# Patient Record
Sex: FEMALE | Race: WHITE | NOT HISPANIC OR LATINO | Employment: FULL TIME | URBAN - METROPOLITAN AREA
[De-identification: names, ages, dates, MRNs, and addresses within clinical notes are randomized per-mention and may not be internally consistent; named-entity substitution may affect disease eponyms.]

---

## 2024-08-06 ENCOUNTER — ROUTINE PRENATAL (OUTPATIENT)
Dept: OBGYN CLINIC | Facility: CLINIC | Age: 39
End: 2024-08-06
Payer: COMMERCIAL

## 2024-08-06 ENCOUNTER — TELEPHONE (OUTPATIENT)
Dept: OBGYN CLINIC | Facility: MEDICAL CENTER | Age: 39
End: 2024-08-06

## 2024-08-06 VITALS — DIASTOLIC BLOOD PRESSURE: 74 MMHG | BODY MASS INDEX: 35.22 KG/M2 | SYSTOLIC BLOOD PRESSURE: 122 MMHG | WEIGHT: 186.4 LBS

## 2024-08-06 DIAGNOSIS — Z98.891 HISTORY OF PRIMARY CESAREAN SECTION: ICD-10-CM

## 2024-08-06 DIAGNOSIS — Z86.73 HISTORY OF STROKE: ICD-10-CM

## 2024-08-06 DIAGNOSIS — Z3A.36 36 WEEKS GESTATION OF PREGNANCY: ICD-10-CM

## 2024-08-06 DIAGNOSIS — Z34.93 THIRD TRIMESTER PREGNANCY: ICD-10-CM

## 2024-08-06 DIAGNOSIS — O10.919 CHRONIC HYPERTENSION AFFECTING PREGNANCY: ICD-10-CM

## 2024-08-06 DIAGNOSIS — O09.899 SHORT INTERVAL BETWEEN PREGNANCIES AFFECTING PREGNANCY, ANTEPARTUM: ICD-10-CM

## 2024-08-06 DIAGNOSIS — Z30.09 STERILIZATION EDUCATION: Primary | ICD-10-CM

## 2024-08-06 DIAGNOSIS — O09.523 MULTIGRAVIDA OF ADVANCED MATERNAL AGE IN THIRD TRIMESTER: ICD-10-CM

## 2024-08-06 PROCEDURE — 87150 DNA/RNA AMPLIFIED PROBE: CPT | Performed by: ADVANCED PRACTICE MIDWIFE

## 2024-08-06 PROCEDURE — PNV: Performed by: ADVANCED PRACTICE MIDWIFE

## 2024-08-06 PROCEDURE — 59025 FETAL NON-STRESS TEST: CPT | Performed by: ADVANCED PRACTICE MIDWIFE

## 2024-08-06 PROCEDURE — 76815 OB US LIMITED FETUS(S): CPT | Performed by: ADVANCED PRACTICE MIDWIFE

## 2024-08-06 NOTE — ASSESSMENT & PLAN NOTE
Desires tubal sterilization  - Patient desires tubal ligation and MA-31 form was signed today  - Today we had a detailed discussion regarding alternatives to permanent contraception. The patient would like to proceed with laparoscopic salpingectomy.  - We discussed the risks and benefits of the procedure and established expectations. We discussed surgical risks including bleeding, injury to nearby structures.   - We discussed that tubal sterilization is considered a permanent procedure. Future pregnancy would only be possible through in vitro fertilization.  We discussed that most women are satisfied with their decision to undergo permanent sterilization, but some women experience regret ranging widely from 2 to 26 percent.   - Sommer is aware that this will only happen if she is having a  and that in cases of emergency , procedure may not be completed.

## 2024-08-06 NOTE — PATIENT INSTRUCTIONS
PT AWARE ,WILL CONTINUE MED AND WILL SCHEDULE APPOINT IN 3 MONTHS Thank you for choosing us for your  care today.  If you have any questions about your ultrasound or care, please do not hesitate to contact us or your primary obstetrician.        Some general instructions for your pregnancy are:    Exercise: Aim for 150 minutes per week of regular exercise.  Walking is great!  Nutrition: Choose healthy sources of calcium, iron, and protein.  Avoid ultraprocessed foods and added sugar.  Learn about Preeclampsia: preeclampsia is a common, potentially serious high blood pressure complication in pregnancy.  A blood pressure of 140mmHg (systolic or top number) or 90mmHg (diastolic or bottom number) should be evaluated by your doctor.  Aspirin is sometimes prescribed in early pregnancy to prevent preeclampsia in women with risk factors - ask your obstetrician if you should be on this medication.  For more resources, visit:  https://www.highriskpregnancyinfo.org/preeclampsia  If you smoke, please try to quit completely but also try to reduce your smoking by as much as possible (as soon as possible).  Do not vape.  Please also avoid cannabis products.  Other warning signs to watch out for in pregnancy or postpartum: chest pain, obstructed breathing or shortness of breath, seizures, thoughts of hurting yourself or your baby, bleeding, a painful or swollen leg, fever, or headache (see AWH[facility] POST-BIRTH Warning Signs campaign).  If these happen call 911.  Itching is also not normal in pregnancy and if you experience this, especially over your hands and feet, potentially worse at night, notify your doctors.

## 2024-08-06 NOTE — TELEPHONE ENCOUNTER
Call made to hospital. Tubal added.     ----- Message from Leslee Caruso CNM sent at 2024  9:23 AM EDT -----  Regarding: joleen Novoa is scheduled for a  on the . She would like to have tubal sterilization at that time. Counseling reviewed at visit today.

## 2024-08-06 NOTE — ASSESSMENT & PLAN NOTE
- repeat  24- scrub given with instructions. Desres tubal sterilization  -OB nurse navigator notified of desire to have tubal with   - Labetalol 200 mg BID  - NST/SUSAN weekly-  reactive/13.9  - reviewed s/s PTL, FKC  - NIPS- low risk  - to decrease ASA to 1 daily  - next visit 1 week

## 2024-08-06 NOTE — PROGRESS NOTES
Routine Prenatal Visit  [facility] [address] [place]    Assessment/Plan:  Sommer is a 38 y.o. year old  at 36w5d who presents for routine prenatal visit.     1. Sterilization education  Assessment & Plan:  Desires tubal sterilization  - Patient desires tubal ligation and MA-31 form was signed today  - Today we had a detailed discussion regarding alternatives to permanent contraception. The patient would like to proceed with laparoscopic salpingectomy.  - We discussed the risks and benefits of the procedure and established expectations. We discussed surgical risks including bleeding, injury to nearby structures.   - We discussed that tubal sterilization is considered a permanent procedure. Future pregnancy would only be possible through in vitro fertilization.  We discussed that most women are satisfied with their decision to undergo permanent sterilization, but some women experience regret ranging widely from 2 to 26 percent.   - Sommer is aware that this will only happen if she is having a  and that in cases of emergency , procedure may not be completed.    2. History of primary  section  3. Multigravida of advanced maternal age in third trimester  4. History of stroke  5. Short interval between pregnancies affecting pregnancy, antepartum  6. Chronic hypertension affecting pregnancy  7. Third trimester pregnancy  -     Strep B DNA probe, amplification  8. 36 weeks gestation of pregnancy  Assessment & Plan:  - repeat  24- scrub given with instructions. Desres tubal sterilization  -OB nurse navigator notified of desire to have tubal with   - Labetalol 200 mg BID  - NST/SUSAN weekly-  reactive/13.9  - reviewed s/s PTL, FKC  - NIPS- low risk  - to decrease ASA to 1 daily  - next visit 1 week    Orders:  -     Strep B DNA probe, amplification        Subjective:     CC: Prenatal care    Sommer Hernandez is a 38 y.o.   female who presents for routine prenatal care at 36w5d.  Pregnancy ROS: no leakage of fluid, pelvic pain, or vaginal bleeding.  Good fetal movement. To decrease ASA to 1 daily. Was nauseated and felt sick at onset of visit. B/P 122/74. Had crackers and banana this morning. Taking B/P daily denies pressures over 140/90.  Negative s/s preeclampsia.     The following portions of the patient's history were reviewed and updated as appropriate: allergies, current medications, past family history, past medical history, obstetric history, gynecologic history, past social history, past surgical history and problem list.      Objective:  /74   Wt 84.6 kg (186 lb 6.4 oz)   LMP 2023 (Exact Date)   BMI 35.22 kg/m²   Pregravid Weight/BMI: Pregravid weight not on file (BMI Could not be calculated)  Current Weight: 84.6 kg (186 lb 6.4 oz)   Total Weight Gain: Not found.   Pre-Rosalia Vitals    Flowsheet Row Most Recent Value   Prenatal Assessment    Fetal Heart Rate 140   Movement Present   Prenatal Vitals    Blood Pressure 122/74   Weight - Scale 84.6 kg (186 lb 6.4 oz)   Urine Albumin/Glucose    Dilation/Effacement/Station    Vaginal Drainage    Edema    LLE Edema Trace   RLE Edema Trace           General: Well appearing, no distress  Respiratory: Unlabored breathing  Cardiovascular: Regular rate.  Abdomen: Soft, gravid, nontender  Fundal Height: Appropriate for gestational age.  Extremities: Warm and well perfused.  Non tender.    no abrasions, no jaundice, no lesions, no pruritis, and no rashes.

## 2024-08-08 LAB — GP B STREP DNA SPEC QL NAA+PROBE: NEGATIVE

## 2024-08-09 ENCOUNTER — ULTRASOUND (OUTPATIENT)
Dept: PERINATAL CARE | Facility: OTHER | Age: 39
End: 2024-08-09
Payer: COMMERCIAL

## 2024-08-09 VITALS
BODY MASS INDEX: 35.34 KG/M2 | SYSTOLIC BLOOD PRESSURE: 114 MMHG | DIASTOLIC BLOOD PRESSURE: 70 MMHG | HEART RATE: 92 BPM | WEIGHT: 187.2 LBS | HEIGHT: 61 IN

## 2024-08-09 DIAGNOSIS — O09.899 SHORT INTERVAL BETWEEN PREGNANCIES AFFECTING PREGNANCY, ANTEPARTUM: ICD-10-CM

## 2024-08-09 DIAGNOSIS — O10.919 CHRONIC HYPERTENSION AFFECTING PREGNANCY: Primary | ICD-10-CM

## 2024-08-09 DIAGNOSIS — Z3A.37 37 WEEKS GESTATION OF PREGNANCY: ICD-10-CM

## 2024-08-09 DIAGNOSIS — O09.523 MULTIGRAVIDA OF ADVANCED MATERNAL AGE IN THIRD TRIMESTER: ICD-10-CM

## 2024-08-09 PROCEDURE — 76816 OB US FOLLOW-UP PER FETUS: CPT | Performed by: OBSTETRICS & GYNECOLOGY

## 2024-08-09 PROCEDURE — 99213 OFFICE O/P EST LOW 20 MIN: CPT | Performed by: OBSTETRICS & GYNECOLOGY

## 2024-08-09 NOTE — PROGRESS NOTES
"[facility]: Sommer Hernandez was seen today for fetal growth assessment ultrasound.  See ultrasound report under \"OB Procedures\" tab.   The time spent on this established patient on the encounter date included 5 minutes previsit service time reviewing records and precharting, 5 minutes face-to-face service time counseling regarding results and coordinating care, and  5 minutes charting, totalling 15 minutes.  Please don't hesitate to contact our office with any concerns or questions.  -Dottie Mathews MD    "

## 2024-08-12 ENCOUNTER — ROUTINE PRENATAL (OUTPATIENT)
Dept: OBGYN CLINIC | Facility: CLINIC | Age: 39
End: 2024-08-12

## 2024-08-12 VITALS — SYSTOLIC BLOOD PRESSURE: 102 MMHG | WEIGHT: 187 LBS | DIASTOLIC BLOOD PRESSURE: 72 MMHG | BODY MASS INDEX: 35.33 KG/M2

## 2024-08-12 DIAGNOSIS — O09.523 MULTIGRAVIDA OF ADVANCED MATERNAL AGE IN THIRD TRIMESTER: ICD-10-CM

## 2024-08-12 DIAGNOSIS — O10.919 CHRONIC HYPERTENSION AFFECTING PREGNANCY: Primary | ICD-10-CM

## 2024-08-12 DIAGNOSIS — O09.899 SHORT INTERVAL BETWEEN PREGNANCIES AFFECTING PREGNANCY, ANTEPARTUM: ICD-10-CM

## 2024-08-12 DIAGNOSIS — Z3A.37 37 WEEKS GESTATION OF PREGNANCY: ICD-10-CM

## 2024-08-12 DIAGNOSIS — I65.22 STENOSIS OF LEFT CAROTID ARTERY: ICD-10-CM

## 2024-08-12 PROCEDURE — PNV: Performed by: STUDENT IN AN ORGANIZED HEALTH CARE EDUCATION/TRAINING PROGRAM

## 2024-08-12 NOTE — PROGRESS NOTES
Routine Prenatal Visit  [facility] [address] [place]    Assessment/Plan:  Sommer is a 38 y.o. year old  at 37w4d who presents for routine prenatal visit.     1. Chronic hypertension affecting pregnancy  2. Short interval between pregnancies affecting pregnancy, antepartum  3. Multigravida of advanced maternal age in third trimester  4. Stenosis of left carotid artery  5. 37 weeks gestation of pregnancy        Subjective:     CC: Prenatal care    Sommer Hernandez is a 38 y.o.  female who presents for routine prenatal care at 37w4d.  Pregnancy ROS: Denies leakage of fluid, pelvic pain, or vaginal bleeding.  Reports fetal movement.    The following portions of the patient's history were reviewed and updated as appropriate: allergies, current medications, past family history, past medical history, obstetric history, gynecologic history, past social history, past surgical history and problem list.      Objective:  /72   Wt 84.8 kg (187 lb)   LMP 2023 (Exact Date)   BMI 35.33 kg/m²   Pregravid Weight/BMI: Pregravid weight not on file (BMI Could not be calculated)  Current Weight: 84.8 kg (187 lb)   Total Weight Gain: Not found.   Pre-Rosalia Vitals      Flowsheet Row Most Recent Value   Prenatal Assessment    Fetal Heart Rate 132   Movement Present   Prenatal Vitals    Blood Pressure 102/72   Weight - Scale 84.8 kg (187 lb)   Urine Albumin/Glucose    Dilation/Effacement/Station    Vaginal Drainage    Edema    LLE Edema Trace   RLE Edema Trace             General: Well appearing, no distress  Respiratory: Unlabored breathing  Cardiovascular: Regular rate.  Abdomen: Soft, gravid, nontender  Fundal Height: Appropriate for gestational age.  Extremities: Warm and well perfused.  Non tender.    Fetal NST  Baseline: 130 BPM  Accelerations: Yes  Decelerations: None  Uterine Irritability: No  Contractions: Not present     Impression: Reactive    SUSAN 10  dorian Flores MD  [facility]  [facility]  8/12/2024 4:06 PM

## 2024-08-14 ENCOUNTER — ANESTHESIA EVENT (INPATIENT)
Dept: LABOR AND DELIVERY | Facility: HOSPITAL | Age: 39
End: 2024-08-14
Payer: COMMERCIAL

## 2024-08-16 ENCOUNTER — ANESTHESIA (INPATIENT)
Dept: LABOR AND DELIVERY | Facility: HOSPITAL | Age: 39
End: 2024-08-16
Payer: COMMERCIAL

## 2024-08-16 ENCOUNTER — HOSPITAL ENCOUNTER (INPATIENT)
Facility: HOSPITAL | Age: 39
LOS: 5 days | Discharge: HOME | End: 2024-08-21
Attending: STUDENT IN AN ORGANIZED HEALTH CARE EDUCATION/TRAINING PROGRAM | Admitting: STUDENT IN AN ORGANIZED HEALTH CARE EDUCATION/TRAINING PROGRAM
Payer: COMMERCIAL

## 2024-08-16 DIAGNOSIS — Z98.891 S/P REPEAT LOW TRANSVERSE C-SECTION: ICD-10-CM

## 2024-08-16 DIAGNOSIS — O10.919 CHRONIC HYPERTENSION AFFECTING PREGNANCY: ICD-10-CM

## 2024-08-16 DIAGNOSIS — Z3A.37 37 WEEKS GESTATION OF PREGNANCY: Primary | ICD-10-CM

## 2024-08-16 PROBLEM — Z3A.38 38 WEEKS GESTATION OF PREGNANCY: Status: ACTIVE | Noted: 2024-02-28

## 2024-08-16 LAB
ABO GROUP BLD: NORMAL
BASE EXCESS BLDCOA CALC-SCNC: -4.4 MMOL/L (ref 3–11)
BASE EXCESS BLDCOV CALC-SCNC: -2.6 MMOL/L (ref 1–9)
BLD GP AB SCN SERPL QL: NEGATIVE
ERYTHROCYTE [DISTWIDTH] IN BLOOD BY AUTOMATED COUNT: 14 % (ref 11.6–15.1)
HCO3 BLDCOA-SCNC: 22.3 MMOL/L (ref 17.3–27.3)
HCO3 BLDCOV-SCNC: 22.8 MMOL/L (ref 12.2–28.6)
HCT VFR BLD AUTO: 32.9 % (ref 34.8–46.1)
HGB BLD-MCNC: 10.6 G/DL (ref 11.5–15.4)
HOLD SPECIMEN: NORMAL
MCH RBC QN AUTO: 28.1 PG (ref 26.8–34.3)
MCHC RBC AUTO-ENTMCNC: 32.2 G/DL (ref 31.4–37.4)
MCV RBC AUTO: 87 FL (ref 82–98)
O2 CT VFR BLDCOA CALC: 4.6 ML/DL
OXYHGB MFR BLDCOA: 23.2 %
OXYHGB MFR BLDCOV: 57.3 %
PCO2 BLDCOA: 46.7 MM[HG] (ref 30–60)
PCO2 BLDCOV: 41.8 MM HG (ref 27–43)
PH BLDCOA: 7.3 [PH] (ref 7.23–7.43)
PH BLDCOV: 7.36 [PH] (ref 7.19–7.49)
PLATELET # BLD AUTO: 241 THOUSANDS/UL (ref 149–390)
PMV BLD AUTO: 11.1 FL (ref 8.9–12.7)
PO2 BLDCOA: 12.3 MM HG (ref 5–25)
PO2 BLDCOV: 23.3 MM HG (ref 15–45)
RBC # BLD AUTO: 3.77 MILLION/UL (ref 3.81–5.12)
RH BLD: POSITIVE
SAO2 % BLDCOV: 12 ML/DL
SPECIMEN EXPIRATION DATE: NORMAL
TREPONEMA PALLIDUM IGG+IGM AB [PRESENCE] IN SERUM OR PLASMA BY IMMUNOASSAY: NORMAL
WBC # BLD AUTO: 9.72 THOUSAND/UL (ref 4.31–10.16)

## 2024-08-16 PROCEDURE — 94762 N-INVAS EAR/PLS OXIMTRY CONT: CPT

## 2024-08-16 PROCEDURE — 86900 BLOOD TYPING SEROLOGIC ABO: CPT

## 2024-08-16 PROCEDURE — NC001 PR NO CHARGE

## 2024-08-16 PROCEDURE — 59510 CESAREAN DELIVERY: CPT | Performed by: STUDENT IN AN ORGANIZED HEALTH CARE EDUCATION/TRAINING PROGRAM

## 2024-08-16 PROCEDURE — 0UB70ZZ EXCISION OF BILATERAL FALLOPIAN TUBES, OPEN APPROACH: ICD-10-PCS | Performed by: STUDENT IN AN ORGANIZED HEALTH CARE EDUCATION/TRAINING PROGRAM

## 2024-08-16 PROCEDURE — 86850 RBC ANTIBODY SCREEN: CPT

## 2024-08-16 PROCEDURE — 58611 LIGATE OVIDUCT(S) ADD-ON: CPT | Performed by: STUDENT IN AN ORGANIZED HEALTH CARE EDUCATION/TRAINING PROGRAM

## 2024-08-16 PROCEDURE — 86901 BLOOD TYPING SEROLOGIC RH(D): CPT

## 2024-08-16 PROCEDURE — 88307 TISSUE EXAM BY PATHOLOGIST: CPT | Performed by: PATHOLOGY

## 2024-08-16 PROCEDURE — 86780 TREPONEMA PALLIDUM: CPT

## 2024-08-16 PROCEDURE — 88302 TISSUE EXAM BY PATHOLOGIST: CPT | Performed by: STUDENT IN AN ORGANIZED HEALTH CARE EDUCATION/TRAINING PROGRAM

## 2024-08-16 PROCEDURE — NC001 PR NO CHARGE: Performed by: STUDENT IN AN ORGANIZED HEALTH CARE EDUCATION/TRAINING PROGRAM

## 2024-08-16 PROCEDURE — 85027 COMPLETE CBC AUTOMATED: CPT

## 2024-08-16 PROCEDURE — 82805 BLOOD GASES W/O2 SATURATION: CPT | Performed by: STUDENT IN AN ORGANIZED HEALTH CARE EDUCATION/TRAINING PROGRAM

## 2024-08-16 PROCEDURE — 4A1HXCZ MONITORING OF PRODUCTS OF CONCEPTION, CARDIAC RATE, EXTERNAL APPROACH: ICD-10-PCS | Performed by: STUDENT IN AN ORGANIZED HEALTH CARE EDUCATION/TRAINING PROGRAM

## 2024-08-16 RX ORDER — OXYTOCIN/RINGER'S LACTATE 30/500 ML
62.5 PLASTIC BAG, INJECTION (ML) INTRAVENOUS ONCE
Status: COMPLETED | OUTPATIENT
Start: 2024-08-16 | End: 2024-08-16

## 2024-08-16 RX ORDER — ACETAMINOPHEN 325 MG/1
650 TABLET ORAL EVERY 6 HOURS SCHEDULED
Status: COMPLETED | OUTPATIENT
Start: 2024-08-16 | End: 2024-08-19

## 2024-08-16 RX ORDER — OXYTOCIN/RINGER'S LACTATE 30/500 ML
PLASTIC BAG, INJECTION (ML) INTRAVENOUS CONTINUOUS PRN
Status: DISCONTINUED | OUTPATIENT
Start: 2024-08-16 | End: 2024-08-16

## 2024-08-16 RX ORDER — FENTANYL CITRATE 50 UG/ML
INJECTION, SOLUTION INTRAMUSCULAR; INTRAVENOUS
Status: COMPLETED
Start: 2024-08-16 | End: 2024-08-16

## 2024-08-16 RX ORDER — KETOROLAC TROMETHAMINE 30 MG/ML
15 INJECTION, SOLUTION INTRAMUSCULAR; INTRAVENOUS EVERY 6 HOURS
Status: COMPLETED | OUTPATIENT
Start: 2024-08-16 | End: 2024-08-17

## 2024-08-16 RX ORDER — IBUPROFEN 600 MG/1
600 TABLET, FILM COATED ORAL EVERY 6 HOURS
Status: DISCONTINUED | OUTPATIENT
Start: 2024-08-18 | End: 2024-08-16

## 2024-08-16 RX ORDER — SODIUM CHLORIDE 9 MG/ML
125 INJECTION, SOLUTION INTRAVENOUS CONTINUOUS
Status: DISCONTINUED | OUTPATIENT
Start: 2024-08-16 | End: 2024-08-16

## 2024-08-16 RX ORDER — IBUPROFEN 600 MG/1
600 TABLET, FILM COATED ORAL EVERY 6 HOURS SCHEDULED
Status: DISCONTINUED | OUTPATIENT
Start: 2024-08-17 | End: 2024-08-17

## 2024-08-16 RX ORDER — KETOROLAC TROMETHAMINE 30 MG/ML
INJECTION, SOLUTION INTRAMUSCULAR; INTRAVENOUS
Status: COMPLETED
Start: 2024-08-16 | End: 2024-08-17

## 2024-08-16 RX ORDER — MORPHINE SULFATE 0.5 MG/ML
INJECTION, SOLUTION EPIDURAL; INTRATHECAL; INTRAVENOUS
Status: COMPLETED
Start: 2024-08-16 | End: 2024-08-16

## 2024-08-16 RX ORDER — SODIUM CHLORIDE, SODIUM LACTATE, POTASSIUM CHLORIDE, CALCIUM CHLORIDE 600; 310; 30; 20 MG/100ML; MG/100ML; MG/100ML; MG/100ML
125 INJECTION, SOLUTION INTRAVENOUS CONTINUOUS
Status: DISCONTINUED | OUTPATIENT
Start: 2024-08-16 | End: 2024-08-16

## 2024-08-16 RX ORDER — ASPIRIN 81 MG/1
81 TABLET, CHEWABLE ORAL DAILY
Status: DISCONTINUED | OUTPATIENT
Start: 2024-08-16 | End: 2024-08-21 | Stop reason: HOSPADM

## 2024-08-16 RX ORDER — IBUPROFEN 600 MG/1
600 TABLET, FILM COATED ORAL EVERY 6 HOURS SCHEDULED
Status: DISCONTINUED | OUTPATIENT
Start: 2024-08-17 | End: 2024-08-16

## 2024-08-16 RX ORDER — ONDANSETRON 2 MG/ML
INJECTION INTRAMUSCULAR; INTRAVENOUS
Status: COMPLETED
Start: 2024-08-16 | End: 2024-08-16

## 2024-08-16 RX ORDER — NALOXONE HYDROCHLORIDE 0.4 MG/ML
0.1 INJECTION, SOLUTION INTRAMUSCULAR; INTRAVENOUS; SUBCUTANEOUS
Status: ACTIVE | OUTPATIENT
Start: 2024-08-16 | End: 2024-08-17

## 2024-08-16 RX ORDER — FENTANYL CITRATE/PF 50 MCG/ML
25 SYRINGE (ML) INJECTION
Status: DISCONTINUED | OUTPATIENT
Start: 2024-08-16 | End: 2024-08-16

## 2024-08-16 RX ORDER — ENOXAPARIN SODIUM 100 MG/ML
40 INJECTION SUBCUTANEOUS
Status: DISCONTINUED | OUTPATIENT
Start: 2024-08-17 | End: 2024-08-21 | Stop reason: HOSPADM

## 2024-08-16 RX ORDER — ONDANSETRON 2 MG/ML
4 INJECTION INTRAMUSCULAR; INTRAVENOUS EVERY 8 HOURS PRN
Status: DISCONTINUED | OUTPATIENT
Start: 2024-08-16 | End: 2024-08-21 | Stop reason: HOSPADM

## 2024-08-16 RX ORDER — CEFAZOLIN SODIUM 2 G/50ML
2000 SOLUTION INTRAVENOUS ONCE
Status: COMPLETED | OUTPATIENT
Start: 2024-08-16 | End: 2024-08-16

## 2024-08-16 RX ORDER — LABETALOL 200 MG/1
200 TABLET, FILM COATED ORAL 2 TIMES DAILY
Status: DISCONTINUED | OUTPATIENT
Start: 2024-08-16 | End: 2024-08-18

## 2024-08-16 RX ORDER — PHENYLEPHRINE HCL IN 0.9% NACL 1 MG/10 ML
SYRINGE (ML) INTRAVENOUS
Status: DISPENSED
Start: 2024-08-16 | End: 2024-08-17

## 2024-08-16 RX ORDER — SENNOSIDES 8.6 MG
1 TABLET ORAL DAILY
Status: DISCONTINUED | OUTPATIENT
Start: 2024-08-16 | End: 2024-08-17

## 2024-08-16 RX ORDER — ASPIRIN 81 MG/1
81 TABLET, CHEWABLE ORAL DAILY
Status: DISCONTINUED | OUTPATIENT
Start: 2024-08-17 | End: 2024-08-16

## 2024-08-16 RX ORDER — BENZOCAINE/MENTHOL 6 MG-10 MG
1 LOZENGE MUCOUS MEMBRANE DAILY PRN
Status: DISCONTINUED | OUTPATIENT
Start: 2024-08-16 | End: 2024-08-21 | Stop reason: HOSPADM

## 2024-08-16 RX ORDER — ONDANSETRON 2 MG/ML
INJECTION INTRAMUSCULAR; INTRAVENOUS AS NEEDED
Status: DISCONTINUED | OUTPATIENT
Start: 2024-08-16 | End: 2024-08-16

## 2024-08-16 RX ORDER — CALCIUM CARBONATE 500 MG/1
1000 TABLET, CHEWABLE ORAL DAILY PRN
Status: DISCONTINUED | OUTPATIENT
Start: 2024-08-16 | End: 2024-08-21 | Stop reason: HOSPADM

## 2024-08-16 RX ORDER — POLYETHYLENE GLYCOL 3350 17 G/17G
17 POWDER, FOR SOLUTION ORAL DAILY
Status: DISCONTINUED | OUTPATIENT
Start: 2024-08-17 | End: 2024-08-21 | Stop reason: HOSPADM

## 2024-08-16 RX ORDER — ONDANSETRON 2 MG/ML
4 INJECTION INTRAMUSCULAR; INTRAVENOUS EVERY 6 HOURS PRN
Status: DISCONTINUED | OUTPATIENT
Start: 2024-08-16 | End: 2024-08-16

## 2024-08-16 RX ORDER — BUPIVACAINE HYDROCHLORIDE 7.5 MG/ML
INJECTION, SOLUTION INTRASPINAL AS NEEDED
Status: DISCONTINUED | OUTPATIENT
Start: 2024-08-16 | End: 2024-08-16

## 2024-08-16 RX ORDER — KETOROLAC TROMETHAMINE 30 MG/ML
30 INJECTION, SOLUTION INTRAMUSCULAR; INTRAVENOUS EVERY 6 HOURS SCHEDULED
Status: DISCONTINUED | OUTPATIENT
Start: 2024-08-16 | End: 2024-08-16

## 2024-08-16 RX ORDER — DIPHENHYDRAMINE HYDROCHLORIDE 50 MG/ML
25 INJECTION INTRAMUSCULAR; INTRAVENOUS EVERY 6 HOURS PRN
Status: ACTIVE | OUTPATIENT
Start: 2024-08-16 | End: 2024-08-17

## 2024-08-16 RX ORDER — ASPIRIN 81 MG/1
162 TABLET, CHEWABLE ORAL DAILY
Status: DISCONTINUED | OUTPATIENT
Start: 2024-08-16 | End: 2024-08-16

## 2024-08-16 RX ORDER — SODIUM CHLORIDE, SODIUM LACTATE, POTASSIUM CHLORIDE, CALCIUM CHLORIDE 600; 310; 30; 20 MG/100ML; MG/100ML; MG/100ML; MG/100ML
125 INJECTION, SOLUTION INTRAVENOUS CONTINUOUS
Status: DISCONTINUED | OUTPATIENT
Start: 2024-08-16 | End: 2024-08-21 | Stop reason: HOSPADM

## 2024-08-16 RX ORDER — DIPHENHYDRAMINE HCL 25 MG
25 TABLET ORAL EVERY 6 HOURS PRN
Status: DISCONTINUED | OUTPATIENT
Start: 2024-08-17 | End: 2024-08-21 | Stop reason: HOSPADM

## 2024-08-16 RX ORDER — OXYTOCIN/RINGER'S LACTATE 30/500 ML
PLASTIC BAG, INJECTION (ML) INTRAVENOUS
Status: COMPLETED
Start: 2024-08-16 | End: 2024-08-16

## 2024-08-16 RX ORDER — ACETAMINOPHEN 325 MG/1
650 TABLET ORAL EVERY 6 HOURS SCHEDULED
Status: DISCONTINUED | OUTPATIENT
Start: 2024-08-16 | End: 2024-08-16

## 2024-08-16 RX ORDER — MORPHINE SULFATE 0.5 MG/ML
INJECTION, SOLUTION EPIDURAL; INTRATHECAL; INTRAVENOUS AS NEEDED
Status: DISCONTINUED | OUTPATIENT
Start: 2024-08-16 | End: 2024-08-16

## 2024-08-16 RX ORDER — DIPHENHYDRAMINE HCL 25 MG
25 TABLET ORAL EVERY 6 HOURS PRN
Status: DISCONTINUED | OUTPATIENT
Start: 2024-08-16 | End: 2024-08-16

## 2024-08-16 RX ORDER — OXYCODONE HYDROCHLORIDE 5 MG/1
5 TABLET ORAL EVERY 4 HOURS PRN
Status: DISCONTINUED | OUTPATIENT
Start: 2024-08-16 | End: 2024-08-21 | Stop reason: HOSPADM

## 2024-08-16 RX ORDER — SIMETHICONE 80 MG
80 TABLET,CHEWABLE ORAL 4 TIMES DAILY PRN
Status: DISCONTINUED | OUTPATIENT
Start: 2024-08-16 | End: 2024-08-21 | Stop reason: HOSPADM

## 2024-08-16 RX ORDER — ONDANSETRON 2 MG/ML
4 INJECTION INTRAMUSCULAR; INTRAVENOUS ONCE AS NEEDED
Status: DISCONTINUED | OUTPATIENT
Start: 2024-08-16 | End: 2024-08-16

## 2024-08-16 RX ORDER — FENTANYL CITRATE 50 UG/ML
INJECTION, SOLUTION INTRAMUSCULAR; INTRAVENOUS AS NEEDED
Status: DISCONTINUED | OUTPATIENT
Start: 2024-08-16 | End: 2024-08-16

## 2024-08-16 RX ADMIN — SENNOSIDES 8.6 MG: 8.6 TABLET, FILM COATED ORAL at 20:22

## 2024-08-16 RX ADMIN — SODIUM CHLORIDE, SODIUM LACTATE, POTASSIUM CHLORIDE, AND CALCIUM CHLORIDE 1000 ML: .6; .31; .03; .02 INJECTION, SOLUTION INTRAVENOUS at 11:15

## 2024-08-16 RX ADMIN — ACETAMINOPHEN 650 MG: 325 TABLET ORAL at 18:08

## 2024-08-16 RX ADMIN — KETOROLAC TROMETHAMINE 15 MG: 30 INJECTION, SOLUTION INTRAMUSCULAR; INTRAVENOUS at 20:22

## 2024-08-16 RX ADMIN — MORPHINE SULFATE 0.15 MG: 0.5 INJECTION, SOLUTION EPIDURAL; INTRATHECAL; INTRAVENOUS at 14:15

## 2024-08-16 RX ADMIN — ONDANSETRON 4 MG: 2 INJECTION INTRAMUSCULAR; INTRAVENOUS at 14:18

## 2024-08-16 RX ADMIN — PHENYLEPHRINE HYDROCHLORIDE 30 MCG/MIN: 10 INJECTION INTRAVENOUS at 14:17

## 2024-08-16 RX ADMIN — Medication 0.06 UNITS/MIN: at 15:53

## 2024-08-16 RX ADMIN — OXYTOCIN 0.06 UNITS/MIN: 10 INJECTION INTRAVENOUS at 15:53

## 2024-08-16 RX ADMIN — BUPIVACAINE HYDROCHLORIDE IN DEXTROSE 1.5 ML: 7.5 INJECTION, SOLUTION SUBARACHNOID at 14:15

## 2024-08-16 RX ADMIN — KETOROLAC TROMETHAMINE 15 MG: 30 INJECTION, SOLUTION INTRAMUSCULAR; INTRAVENOUS at 15:12

## 2024-08-16 RX ADMIN — Medication 250 MILLI-UNITS/MIN: at 14:35

## 2024-08-16 RX ADMIN — CEFAZOLIN SODIUM 2000 MG: 2 SOLUTION INTRAVENOUS at 14:17

## 2024-08-16 RX ADMIN — SODIUM CHLORIDE, SODIUM LACTATE, POTASSIUM CHLORIDE, AND CALCIUM CHLORIDE 125 ML/HR: .6; .31; .03; .02 INJECTION, SOLUTION INTRAVENOUS at 12:08

## 2024-08-16 RX ADMIN — ASPIRIN 81 MG CHEWABLE TABLET 81 MG: 81 TABLET CHEWABLE at 21:05

## 2024-08-16 RX ADMIN — FENTANYL CITRATE 15 MCG: 50 INJECTION INTRAMUSCULAR; INTRAVENOUS at 14:15

## 2024-08-16 RX ADMIN — LABETALOL HYDROCHLORIDE 200 MG: 200 TABLET, FILM COATED ORAL at 18:08

## 2024-08-16 NOTE — ANESTHESIA POSTPROCEDURE EVALUATION
Post-Op Assessment Note    CV Status:  Stable  Pain Score: 0    Pain management: adequate       Mental Status:  Alert and awake   Hydration Status:  Euvolemic   PONV Controlled:  Controlled   Airway Patency:  Patent     Post Op Vitals Reviewed: Yes    No anethesia notable event occurred.    Staff: SUELLEN               /70 (08/16/24 1600)    Temp 97.6 °F (36.4 °C) (08/16/24 1600)    Pulse 57 (08/16/24 1600)   Resp 18 (08/16/24 1600)    SpO2 95 % (08/16/24 1600)

## 2024-08-16 NOTE — DISCHARGE SUMMARY
Discharge Summary - Sommer Hernandez 38 y.o. female MRN: [id number]    Unit/Bed#: -01 Encounter: [id number]    Admission Date: 2024     Discharge Date: 24    Patient Active Problem List   Diagnosis    History of stroke    Hyperglycemia    Thyroid nodule    Hypertension    Anxiety    Stenosis of left carotid artery    Mixed hyperlipidemia    Abnormal TSH    Obesity in pregnancy    Polyp of cervix affecting pregnancy in second trimester    Multigravida of advanced maternal age in third trimester    History of primary  section    S/P repeat low transverse     Short interval between pregnancies affecting pregnancy, antepartum    Chronic hypertension affecting pregnancy    Sterilization education       OBGYN Practice: [facility]:   Sommer Hernandez is a 38 y.o.  at 38w1d admitted for scheduled RLTCS in the setting of cHTN controlled on labetalol 200mg BID. She has a history of stroke in 2017 for which she was maintained on LDASA until delivery.     Delivery Findings:  Sommer delivered a viable female  on 2024 2:34 PM via , Low Transverse . The delivery was uncomplicated    Baby's Weight: 2720 g (5 lb 15.9 oz); 95.94    Apgar scores: 8  and 9  at 1 and 5 minutes, respectively  Anesthesia: spinal  QBL: 241     was transferred to  nursery. Patient tolerated the procedure well and was transferred to recovery in stable condition.     Her post-partum course was complicated by severe blood pressures; requiring titration of Labetalol 600mg TID and lasix 20 mg QD. Her post-partum pain was well controlled with oral analgesics. On day of discharge she was ambulating, voiding spontaneously, tolerating oral intake and hemodynamically stable. Mom's blood type is O positive and  Rhogam was not given.    She was discharged home on postpartum day #5 without complications. Patient was instructed to follow up with her OB  as an outpatient and was given appropriate warnings to call doctor or provider if she develops signs of infection or uncontrolled pain.    Discharge Problem List by Issue:   Chronic hypertension affecting pregnancy  Assessment & Plan  Home lab 200 BID  Increased Labetalol dosage to 600/300/600mg, Lasix 20mg qD for 05 days (starting )  Increased Labetalol dosage to 600md TID, Lasix 20md 2nd day (24)    This patient is Enrolled in the OB Postpartum Blood Pressure Monitoring Program.    Systolic (24hrs), Av , Min:125 , Max:175   Diastolic (24hrs), Av, Min:76, Max:99        History of stroke  Assessment & Plan  Continue LDASA daily  Avoid TXA    * S/P repeat low transverse   Assessment & Plan  Routine postpartum care  Encourage ambulation  Encourage familial bonding  Lactation support as needed  Pain: Motrin/Tylenol around the clock, oxycodone if needed  Postpartum Contraceptive plan: S/p Tubal  Pre-delivery Hgb 10.6 --> 8.8, received venofer  Yuen catheter: passed VT  DVT Ppx: ambulation, SCDs, Lovenox 40mg         Disposition: Home    Planned Readmission: No    Discharge Medications:   Please see AVS    Discharge instructions :   -Do not place anything (no partner, tampons or douche) in your vagina for 6 weeks.  -You may walk for exercise for the first 6 weeks then gradually return to your usual activities.   -Please do not drive for 1 week if you have no stitches and for 2 weeks if you have stitches or underwent a  delivery.    -You may take baths or shower per your preference.   -Please look at your bust (breasts) in the mirror daily and call your doctor for redness or tenderness or increased warmth.   - If you have had a  section please look at your incision daily as well and call provider for increasing redness or steady drainage from the incision.   -Please call your doctor's office if temperature > 100.4*F or 38* C, worsening pain or a foul discharge.    Follow  Up:  -Utilize blood pressure program and follow protocols.  - Follow up in 3 weeks for postpartum visit

## 2024-08-16 NOTE — ANESTHESIA PREPROCEDURE EVALUATION
Procedure:   SECTION () REPEAT (Uterus)  LIGATION/COAGULATION TUBAL (Bilateral: Abdomen)    Relevant Problems   ANESTHESIA (within normal limits)      CARDIO   (+) Chronic hypertension affecting pregnancy   (+) Hypertension   (+) Mixed hyperlipidemia   (+) Stenosis of left carotid artery      GYN   (+) 37 weeks gestation of pregnancy   (+) Multigravida of advanced maternal age in third trimester      NEURO/PSYCH   (+) Anxiety      Endocrine   (+) Thyroid nodule      Obstetrics/Gynecology   (+) History of primary  section   (+) Obesity in pregnancy      Neurology/Sleep   (+) History of stroke        Physical Exam    Airway    Mallampati score: II         Dental   No notable dental hx     Cardiovascular  Cardiovascular exam normal    Pulmonary  Pulmonary exam normal Breath sounds clear to auscultation    Other Findings  post-pubertal.      Anesthesia Plan  ASA Score- 3     Anesthesia Type- spinal with ASA Monitors.         Additional Monitors:     Airway Plan:            Plan Factors-    Chart reviewed.   Existing labs reviewed. Patient summary reviewed.    Patient is not a current smoker.              Induction-     Postoperative Plan-     Perioperative Resuscitation Plan - Level 1 - Full Code.       Informed Consent- Anesthetic plan and risks discussed with patient.

## 2024-08-16 NOTE — OP NOTE
OPERATIVE REPORT  PATIENT NAME: Sommer Hernandez    :  1985  MRN: [id number]  Pt Location: [facility] L&D OR ROOM 01    SURGERY DATE: 2024    Surgeons and Role:     * Zoraida Flores MD - Primary     * Nelida Lopez DO - Assisting    Preop Diagnosis:  History of 1LTCS   Chronic hypertension  Pregnancy at 38 weeks gestation    Postop Diagnosis:   Same, delivered    Procedure(s) (LRB):   SECTION () REPEAT (N/A)  LIGATION/COAGULATION TUBAL (Bilateral)    Specimen(s):  ID Type Source Tests Collected by Time Destination   1 :  Tissue Fallopian Tube, Right TISSUE EXAM Zoraida Flores MD 2024 1343    2 :  Tissue Fallopian Tube, Left TISSUE EXAM Zoraida Flores MD 2024 1343    3 :  Tissue (Placenta on Hold) OB Only Placenta TISSUE EXAM OB (PLACENTA) ONLY Zoraida Flores MD 2024 1438    A :  Cord Blood Cord BLOOD GAS, VENOUS, CORD, BLOOD GAS, ARTERIAL, CORD Zoraida Flores MD 2024 1121    B :  Tissue Fallopian Tube, Left  Zoraida Flores MD 2024 1343        Surgical QBL:  Surgical QBL (mL): 241 mL      Drains:  Urethral Catheter Double-lumen 16 Fr. (Active)   Goal for Removal Remove POD#1 24 1530   Site Assessment Clean;Skin intact 24 1530   Collection Container Standard drainage bag 24 1530   Number of days: 0       Anesthesia Type:   Spinal    Operative Indications:  Hx of 1LTCS   Chronic hypertension  Pregnancy at 38 weeks gestation     Justino Group Classification System:  No Multiple pregnancy, No Transverse or oblique lie, No Breech lie, Gestational age is > or =37 weeks, Multiparous, Previous uterine scar +  is JUSTINO GROUP 5      Brief History  Sommer Hernandez is a 38 y.o.  at 38w1d admitted for scheduled RLTCS in the setting of cHTN controlled on labetalol 200mg BID. She has a history of stroke in 2017 for which she was maintained on LDASA until delivery.       Operative Findings:  1. Viable  female  on 2024 at 1434 with APGARs of 8 and 9 at 1 and 5 minutes. Fetus weighted 5lb 15oz.  2. Clear amniotic fluid  3. Placenta noted to have calcifications with bilobed appearance with centrally inserted 3VC.  4. Normal uterus, bilateral tubes and ovaries  5. Adhesions absent    Umbilical Cord Venous Blood Gas:  Results from last 7 days   Lab Units 24  1121   PH COV  7.355   PCO2 COV mm HG 41.8   HCO3 COV mmol/L 22.8   BASE EXC COV mmol/L -2.6*   O2 CT CD VB mL/dL 12.0   O2 HGB, VENOUS CORD % 57.3     Umbilical Cord Arterial Blood Gas:  Results from last 7 days   Lab Units 24  1121   PH COA  7.296   PCO2 COA  46.7   PO2 COA mm HG 12.3   HCO3 COA mmol/L 22.3   BASE EXC COA mmol/L -4.4*   O2 CONTENT CORD ART ml/dl 4.6   O2 HGB, ARTERIAL CORD % 23.2       Operative Technique  The patient was taken to the operating room. Spinal anesthesia was adequately established and Ancef 2g was given for preoperative prophylaxis. The patient was then placed in the dorsal supine position with a left tilt of the hips. The patient was then prepped with betadine for vaginal prep and chloraprep for abdominal prep and draped in the usual sterile fashion for a Pfannenstiel skin incision.      A time out was performed to confirm correct patient and correct procedure.     An incision was made in the skin with a surgical scalpel and sharp dissection was carried out over subsequent layers of tissue including the fascia, followed by the Bovie electrocautery for hemostasis. The fascia was incised at the midline and the fascial incision was extended bilaterally using the curved Soto scissors.      The superior edge of the fascial incision was grasped with Kocher clamps, tented up and the underlying rectus muscles were dissected off bluntly and sharply using the scalpel. The rectus muscles were then divided at midline and the peritoneum was identified, tented up at its upper margin taking care to avoid the bladder, and  then entered. The peritoneal incision was extended superiorly and inferiorly.     The bladder blade was inserted and a transverse incision was made in the lower uterine segment using a new surgical blade. The uterine incision was extended cephalad and caudal using blunt dissection. The amniotic sac was entered.    The surgeon's hand was placed into the uterine cavity. The fetal head was identified and elevated through the uterine incision with the assistance of fundal pressure. With gentle traction, the shoulder was delivered, followed by the rest of the fetal body. There was no nuchal cord noted. On delivery the cord was doubly clamped and cut after delayed cord clamping. The infant was then passed off the table to the awaiting  staff. The  was noted to cry spontaneously and moved all extremities. Venous and arterial blood gas, cord blood, and portion of cord was obtained for analysis and routine blood testing. The placenta delivery was then sent to storage. Placenta was noted to be intact with a centrally inserted three-vessel cord.  Oxytocin was administered by IV infusion to enhance uterine contraction. The uterus was cleared of all clots and remaining products of conception.      The Jayden-O retractor was inserted. The uterine incision was re approximated using an 0 Vicryl in a running locked fashion. A second vertical imbricating stitch with the same suture was applied. The uterine incision was examined and noted to be hemostatic.     The left fallopian tube was grasped at its fimbriated end with a Felipe and elevated to visualize the mesosalpinx.The Enseal was used to ligate along the mesosalpinx, working proximally and taking care to avoid ovarian vasculature. Approximately 2cm from the cornua, the enseal was used to amputate fallopian tube. Plain gut suture was used to secure the pedicles. Specimen was sent for pathology. Attention was then turned to the contralateral tube, which was  amputated in similar fashion. Good hemostasis was confirmed following salpingectomy.    The fascia was re approximated using an 0 Vicryl in a running nonlocked fashion. The subcutaneous tissue was irrigated and cleared of all clots and debris. Good hemostasis was noted with Bovie electrocautery. The subcutaneous tissue was reapproximated with 3-0 plain gut. The skin incision was closed using 4-0 stratifix with exofin.  Good hemostasis was noted. A pressure dressing and abdominal binder were both applied. Patient tolerated the procedure well. All needle, sponge, and instrument counts were noted to be correct x 2 at the end of the procedure.  Patient was transferred to the recovery room in stable condition. Dr. Flores was present and participated in the entire surgery.      Complications:   None apparent    Patient Disposition:  Postpartum    SIGNATURE: Nelida Lopez DO  DATE: August 16, 2024  TIME: 3:38 PM

## 2024-08-16 NOTE — ANESTHESIA PROCEDURE NOTES
Spinal Block    Patient location during procedure: OB/L&D  Start time: 8/16/2024 2:15 PM  Reason for block: primary anesthetic  Staffing  Performed by: Jayden Vilchis CRNA  Authorized by: Enrike Marinelli DO    Preanesthetic Checklist  Completed: patient identified, IV checked, site marked, risks and benefits discussed, surgical consent, monitors and equipment checked, pre-op evaluation and timeout performed  Spinal Block  Patient position: sitting  Prep: Betadine  Patient monitoring: heart rate, continuous pulse ox and frequent blood pressure checks  Approach: midline  Location: L3-4  Needle  Needle type: Pencil Point   Needle gauge: 24 G  Needle length: 4 in  Assessment  Injection Assessment:  positive aspiration for clear CSF.  Post-procedure:  site cleaned

## 2024-08-16 NOTE — LACTATION NOTE
This note was copied from a baby's chart.  CONSULT - LACTATION  Baby Girl Hernandez (Jessica) 0 days female MRN: [id number]    formerly [facility] NURSERY Room / Bed:  411(N)/ 411(N) Encounter: [id number]    Maternal Information     MOTHER:  Ewelina Hernandez  Maternal Age: 38 y.o.  OB History: # 1 - Date: None, Sex: None, Weight: None, GA: None, Type: None, Apgar1: None, Apgar5: None, Living: None, Birth Comments: None    # 2 - Date: 23, Sex: Female, Weight: 2650 g (5 lb 13.5 oz), GA: 38w0d, Type: , Low Transverse, Apgar1: 8, Apgar5: 9, Living: Living, Birth Comments: None    # 3 - Date: 24, Sex: Female, Weight: 2720 g (5 lb 15.9 oz), GA: 38w1d, Type: , Low Transverse, Apgar1: 8, Apgar5: 9, Living: Living, Birth Comments: None   Previouse breast reduction surgery? No    Lactation history:   Has patient previously breast fed: Yes   How long had patient previously breast fed: about 9 months   Previous breast feeding complications: Other (Comment), Low milk supply (establishing breast feeding then supplemnt when return to work)     Past Surgical History:   Procedure Laterality Date    [place]  DELIVERY ONLY N/A 3/23/2023    Procedure:  SECTION ();  Surgeon: Zoraida Flores MD;  Location: [facility];  Service: Obstetrics    TOOTH EXTRACTION      wisdom teeth       Birth information:  YOB: 2025   Time of birth: 2:34 PM   Sex: female   Delivery type: , Low Transverse   Birth Weight: 2720 g (5 lb 15.9 oz)   Percent of Weight Change: 0%     Gestational Age: 38w1d   [unfilled]    Assessment     Breast and nipple assessment:  Denies need for assistance at this time      Assessment: sleepy    Feeding assessment:  A few sucks     LATCH:  Latch: Repeated attempts, hold nipple in mouth, stimulate to suck   Audible Swallowing: A few with stimulation   Type of Nipple: Everted (After stimulation)   Comfort  (Breast/Nipple): Filling, red/small blisters/bruises, mild/moderate discomfort   Hold (Positioning): Partial assist, teach one side, mother does other, staff holds   LATCH Score: 6          Feeding recommendations:  breast feed on demand; express as needed till latching well     Met with Famil.   Provided  with Ready, Set, Baby booklet which contained information on:  Hand expression with access to QR codes to review hand expression.  Positioning and latch reviewed as well as showing images of other feeding positions.  Discussed the properties of a good latch in any position.   Feeding on cue and what that means for recognizing infant's hunger, s/s that baby is getting enough milk and some s/s that breastfeeding dyad may need further help  Skin to Skin contact and benefits to mom and baby  Avoidance of pacifiers for the first month discussed.   Gave information on common concerns, what to expect the first few weeks after delivery, preparing for other caregivers, and how partners can help. Resources for support also provided.    Also reviewed discharge breastfeeding booklet including the feeding log. Emphasized 8 or more (12) feedings in a 24 hour period, what to expect for the number of diapers per day of life and the progression of properties of the  stooling pattern.    List of reasons to call a lactation consultant.  Feeding logs  Feeding cues  Hand expression  Baby's Second day (cluster feeding)  Breastfeeding and Your Lifestyle (Medications, Alcohol, Caffeine, Smoking, Street Drugs, Methadone)  First Two Weeks Survival Guide for Breastfeeding  Breast Changes  Physical Therapy  Storage and Handling of Breast milk  How to Keep Your Breast Pump Kit Clean  The Employed Breastfeeding Mother  Mixed feeding  Bottle feeding like breastfeeding (paced bottle feeding)  astfeeding and your lifestyle, storage and preparation of breast milk, how to keep you breast pump clean, the employed breastfeeding mother and  paced bottle feeding handouts.     Booklet included Breastfeeding Resources for after discharge including access to the number for the [facility].    Encouraged parents to call for assistance, questions, and concerns about breastfeeding.  Extension provided.                Dawna Infante RN 8/16/2024 5:23 PM

## 2024-08-16 NOTE — ASSESSMENT & PLAN NOTE
Routine postpartum care  Encourage ambulation  Encourage familial bonding  Lactation support as needed  Pain: Motrin/Tylenol around the clock, oxycodone if needed  Postpartum Contraceptive plan: S/p Tubal  Pre-delivery Hgb 10.6 --> 8.8, received venofer  Yuen catheter: passed VT  DVT Ppx: ambulation, SCDs, Lovenox 40mg

## 2024-08-16 NOTE — PLAN OF CARE

## 2024-08-16 NOTE — H&P
H & P- Obstetrics   Sommer Hernandez 38 y.o. female MRN: [id number]  Unit/Bed#:  TRIAGE - Encounter: [id number]      Assessment/Plan:  Sommer is a 38 y.o.  at 38w1d admitted for scheduled RLTCS in the setting of cHTN on Labetalol      Sterilization education  Assessment & Plan  Patient maintains desire for sterilzation. Consents signed and available.    Chronic hypertension affecting pregnancy  Assessment & Plan  Continue home lab 200 BID    38 weeks gestation of pregnancy  Assessment & Plan  Admit to OBGYN  CBC, RPR, Blood Type & Screen  3.  Anesthesia consult for spinal  4.  Ancef 2 gm for ppx  5.  NPO   6.  To OR for  delivery      History of primary  section  Assessment & Plan  Patient elects for RLTCS    History of stroke  Assessment & Plan  S/p LDASA until 36w         Patient of: [facility]  This patient will be an INPATIENT  and I certify the anticipated length of stay is >2 Midnights  Discussed with Dr. Flores      SUBJECTIVE:    Chief Complaint: Here for my     HPI: Sommer Hernandez is a 38 y.o.  with an SHYANNE of [birth year]24, by Last Menstrual Period at 38w1d who is being admitted for RLTCS in the setting of cHTN well controlled on meds. She denies having uterine contractions, has no LOF, and reports no VB. She states she has felt good FM.. This pregnancy is complicated by cHTN controlled on meds, hx of , desire for sterilization, hx of stroke in 2017. All other review of systems is negative.       Pregnancy Plan:  Pregnancy: Castro  Fetal sex: Male  Support person: Anatoly     Delivery Plans  Planned delivery method:   Planned delivery location: [place] [facility]     Post-Delivery Plans  Feeding intentions: Breast Milk and Non-human milk substitute      Patient Active Problem List   Diagnosis    History of stroke    Hyperglycemia    Thyroid nodule    Hypertension    Anxiety    Stenosis of left carotid artery    Mixed  hyperlipidemia    Abnormal TSH    Obesity in pregnancy    Polyp of cervix affecting pregnancy in second trimester    Multigravida of advanced maternal age in third trimester    History of primary  section    38 weeks gestation of pregnancy    Short interval between pregnancies affecting pregnancy, antepartum    Chronic hypertension affecting pregnancy    Sterilization education       OB History    Para Term  AB Living   3 1 1 0 1 1   SAB IAB Ectopic Multiple Live Births   0 0 0 0 1      # Outcome Date GA Lbr Leonard/2nd Weight Sex Type Anes PTL Lv   3 Current            2 Term 23 38w0d  2650 g (5 lb 13.5 oz) F CS-LTranv Spinal  GIOVANNY   1 AB                Past Medical History:   Diagnosis Date    Carotid stenosis, asymptomatic, left 2021 There is <50% stenosis noted in the internal carotid artery on Left. Plaque is homogenous and smooth. 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    Thyroid nodule 04/10/2018    5/26/20 US completed and does not recommend followup. Stable since 2018       Past Surgical History:   Procedure Laterality Date    [place]  DELIVERY ONLY N/A 3/23/2023    Procedure:  SECTION ();  Surgeon: Zoraida Flores MD;  Location: [facility];  Service: Obstetrics    TOOTH EXTRACTION      wisdom teeth       Social History     Tobacco Use    Smoking status: Never    Smokeless tobacco: Never   Substance Use Topics    Alcohol use: Not Currently       Allergies   Allergen Reactions    Amoxicillin Rash    Penicillins Rash         Medications Prior to Admission:     aspirin (ECOTRIN LOW STRENGTH) 81 mg EC tablet    labetalol (NORMODYNE) 200 mg tablet    Prenatal Vit-Fe Fumarate-FA (PRENATAL VITAMINS PO)    magnesium oxide-pyridoxine (BEELITH) 362-20 MG TABS        OBJECTIVE:  Vitals:  Temp:  [97.4 °F (36.3 °C)] 97.4 °F (36.3  "°C)  HR:  [] 81  Resp:  [18] 18  BP: (122-132)/(71-77) 132/77  Body mass index is 34.77 kg/m².     Physical Exam:  General: Well appearing, no distress  Respiratory: Unlabored breathing  Cardiovascular: Regular rate.  Abdomen: Soft, gravid, nontender  Fundal Height: Appropriate for gestational age.  Extremities: Warm and well perfused.  Non tender.  Psychiatric: Behavioral normal        FHT:  Baseline Rate (FHR): 130 bpm  Variability: Moderate  Accelerations: 15 x 15 or greater  Decelerations: None    TOCO:   Contraction Frequency (minutes): 0  Contraction Duration (seconds): 0      Prenatal Labs: Blood Type:   Lab Results   Component Value Date/Time    ABO Grouping O 08/16/2024 11:12 AM     , D (Rh type):   Lab Results   Component Value Date/Time    Rh Factor Positive 08/16/2024 11:12 AM     , Antibody Screen: neg   , MCV:   Lab Results   Component Value Date/Time    MCV 87 08/16/2024 11:12 AM    MCV 87 01/09/2015 07:31 AM      , Platelets:   Lab Results   Component Value Date/Time    Platelets 241 08/16/2024 11:12 AM    Platelets 330 01/09/2015 07:31 AM      , 1 hour Glucola:   Lab Results   Component Value Date/Time    Glucose 100 06/03/2024 11:43 PM   , Varicella: No results found for: \"VARICELLAIGG\"    , Rubella:   Lab Results   Component Value Date/Time    Rubella IgG Quant 14.7 (L) 01/30/2024 09:21 AM        , VDRL/RPR:   Lab Results   Component Value Date/Time    RPR Non-Reactive 09/21/2022 01:32 PM      , Urine Culture/Screen:   Lab Results   Component Value Date/Time    Urine Culture 50,000-59,000 cfu/ml Lactobacillus species (A) 02/23/2024 10:06 AM       , Hep B:   Lab Results   Component Value Date/Time    Hepatitis B Surface Ag Non-reactive 01/30/2024 09:21 AM     , Hep C: nonreactive     , HIV:   Lab Results   Component Value Date/Time    HIV-1/HIV-2 Ab Non-Reactive 09/21/2022 01:32 PM     , Chlamydia: negative    , Gonorrhea:   Lab Results   Component Value Date/Time    N gonorrhoeae, DNA Probe " "Negative 03/01/2024 07:13 AM     , Group B Strep:    Lab Results   Component Value Date/Time    Strep Grp B PCR Negative 08/06/2024 09:22 AM            Nelida Lopez DO  8/16/2024  1:24 PM        Portions of the record may have been created with voice recognition software.  Occasional wrong word or \"sound a like\" substitutions may have occurred due to the inherent limitations of voice recognition software.  Read the chart carefully and recognize, using context, where substitutions have occurred    "

## 2024-08-16 NOTE — PLAN OF CARE
Problem: BIRTH - VAGINAL/ SECTION  Goal: Fetal and maternal status remain reassuring during the birth process  Description: INTERVENTIONS:  - Monitor vital signs  - Monitor fetal heart rate  - Monitor uterine activity  - Monitor labor progression (vaginal delivery)  - DVT prophylaxis  - Antibiotic prophylaxis  2024 1608 by Fatoumata Stratton RN  Outcome: Completed  2024 1057 by Fatoumata Stratton RN  Outcome: Progressing  Goal: Emotionally satisfying birthing experience for mother/fetus  Description: Interventions:  - Assess, plan, implement and evaluate the nursing care given to the patient in labor  - Advocate the philosophy that each childbirth experience is a unique experience and support the family's chosen level of involvement and control during the labor process   - Actively participate in both the patient's and family's teaching of the birth process  - Consider cultural, Restorationist and age-specific factors and plan care for the patient in labor  2024 1608 by Fatoumata Stratton RN  Outcome: Completed  2024 by Fatoumata Stratton RN  Outcome: Progressing     Problem: POSTPARTUM  Goal: Experiences normal postpartum course  Description: INTERVENTIONS:  - Monitor maternal vital signs  - Assess uterine involution and lochia  Outcome: Progressing  Goal: Appropriate maternal -  bonding  Description: INTERVENTIONS:  - Identify family support  - Assess for appropriate maternal/infant bonding   -Encourage maternal/infant bonding opportunities  - Referral to  or  as needed  Outcome: Progressing  Goal: Establishment of infant feeding pattern  Description: INTERVENTIONS:  - Assess breast/bottle feeding  - Refer to lactation as needed  Outcome: Progressing  Goal: Incision(s), wounds(s) or drain site(s) healing without S/S of infection  Description: INTERVENTIONS  - Assess and document dressing, incision, wound bed, drain sites and surrounding tissue  - Provide  patient and family education  - Perform skin care/dressing changes every day  Outcome: Progressing

## 2024-08-16 NOTE — ASSESSMENT & PLAN NOTE
Home lab 200 BID  Increased Labetalol dosage to 600/300/600mg, Lasix 20mg qD for 05 days (starting )  Increased Labetalol dosage to 600md TID, Lasix 20md 2nd day (24)    This patient is Enrolled in the OB Postpartum Blood Pressure Monitoring Program.    Systolic (24hrs), Av , Min:125 , Max:175   Diastolic (24hrs), Av, Min:76, Max:99

## 2024-08-17 LAB
ERYTHROCYTE [DISTWIDTH] IN BLOOD BY AUTOMATED COUNT: 14.2 % (ref 11.6–15.1)
HCT VFR BLD AUTO: 27.5 % (ref 34.8–46.1)
HGB BLD-MCNC: 8.8 G/DL (ref 11.5–15.4)
MCH RBC QN AUTO: 29.5 PG (ref 26.8–34.3)
MCHC RBC AUTO-ENTMCNC: 32 G/DL (ref 31.4–37.4)
MCV RBC AUTO: 92 FL (ref 82–98)
PLATELET # BLD AUTO: 175 THOUSANDS/UL (ref 149–390)
PMV BLD AUTO: 11.1 FL (ref 8.9–12.7)
RBC # BLD AUTO: 2.98 MILLION/UL (ref 3.81–5.12)
WBC # BLD AUTO: 10.61 THOUSAND/UL (ref 4.31–10.16)

## 2024-08-17 PROCEDURE — 99024 POSTOP FOLLOW-UP VISIT: CPT | Performed by: OBSTETRICS & GYNECOLOGY

## 2024-08-17 PROCEDURE — 85027 COMPLETE CBC AUTOMATED: CPT

## 2024-08-17 RX ORDER — ADHESIVE BANDAGE 3/4"
BANDAGE TOPICAL 2 TIMES DAILY
Qty: 1 EACH | Refills: 0 | Status: SHIPPED | OUTPATIENT
Start: 2024-08-17

## 2024-08-17 RX ORDER — IBUPROFEN 600 MG/1
600 TABLET, FILM COATED ORAL EVERY 6 HOURS
Status: DISCONTINUED | OUTPATIENT
Start: 2024-08-18 | End: 2024-08-21 | Stop reason: HOSPADM

## 2024-08-17 RX ADMIN — KETOROLAC TROMETHAMINE 15 MG: 30 INJECTION, SOLUTION INTRAMUSCULAR; INTRAVENOUS at 09:01

## 2024-08-17 RX ADMIN — LABETALOL HYDROCHLORIDE 200 MG: 200 TABLET, FILM COATED ORAL at 09:01

## 2024-08-17 RX ADMIN — KETOROLAC TROMETHAMINE 15 MG: 30 INJECTION, SOLUTION INTRAMUSCULAR; INTRAVENOUS at 03:15

## 2024-08-17 RX ADMIN — ACETAMINOPHEN 650 MG: 325 TABLET ORAL at 06:15

## 2024-08-17 RX ADMIN — ACETAMINOPHEN 650 MG: 325 TABLET ORAL at 00:15

## 2024-08-17 RX ADMIN — SENNOSIDES 8.6 MG: 8.6 TABLET, FILM COATED ORAL at 09:01

## 2024-08-17 RX ADMIN — IBUPROFEN 600 MG: 600 TABLET, FILM COATED ORAL at 21:01

## 2024-08-17 RX ADMIN — ACETAMINOPHEN 650 MG: 325 TABLET ORAL at 18:04

## 2024-08-17 RX ADMIN — ACETAMINOPHEN 650 MG: 325 TABLET ORAL at 12:02

## 2024-08-17 RX ADMIN — IBUPROFEN 600 MG: 600 TABLET, FILM COATED ORAL at 15:01

## 2024-08-17 RX ADMIN — ASPIRIN 81 MG CHEWABLE TABLET 81 MG: 81 TABLET CHEWABLE at 19:59

## 2024-08-17 RX ADMIN — IRON SUCROSE 200 MG: 20 INJECTION, SOLUTION INTRAVENOUS at 09:20

## 2024-08-17 RX ADMIN — LABETALOL HYDROCHLORIDE 200 MG: 200 TABLET, FILM COATED ORAL at 18:04

## 2024-08-17 RX ADMIN — ENOXAPARIN SODIUM 40 MG: 40 INJECTION SUBCUTANEOUS at 09:01

## 2024-08-17 RX ADMIN — POLYETHYLENE GLYCOL 3350 17 G: 17 POWDER, FOR SOLUTION ORAL at 09:02

## 2024-08-17 NOTE — PLAN OF CARE
Problem: POSTPARTUM  Goal: Experiences normal postpartum course  Description: INTERVENTIONS:  - Monitor maternal vital signs  - Assess uterine involution and lochia  Outcome: Progressing  Goal: Appropriate maternal -  bonding  Description: INTERVENTIONS:  - Identify family support  - Assess for appropriate maternal/infant bonding   -Encourage maternal/infant bonding opportunities  - Referral to  or  as needed  Outcome: Progressing  Goal: Establishment of infant feeding pattern  Description: INTERVENTIONS:  - Assess breast/bottle feeding  - Refer to lactation as needed  Outcome: Progressing  Goal: Incision(s), wounds(s) or drain site(s) healing without S/S of infection  Description: INTERVENTIONS  - Assess and document dressing, incision, wound bed, drain sites and surrounding tissue  - Provide patient and family education  - Perform skin care/dressing changes PRN    Outcome: Progressing

## 2024-08-17 NOTE — PROGRESS NOTES
Progress Note - OB/GYN   Sommer Hernandez 38 y.o. female MRN: [id number]  Unit/Bed#:  411-01 Encounter: [id number]      Assessment/Plan    Sommer Hernandez is a 38 y.o.  who is POD #1 s/p RLTCS +BS at 38w1d     Chronic hypertension affecting pregnancy  Assessment & Plan  Continue home lab 200 BID    History of stroke  Assessment & Plan  Continue LDASA daily  Avoid TXA    * S/P repeat low transverse   Assessment & Plan  Routine postpartum care  Encourage ambulation  Encourage familial bonding  Lactation support as needed  Pain: Motrin/Tylenol around the clock, oxycodone if needed  Postpartum Contraceptive plan: S/p Tubal  Pre-delivery Hgb 10.6 --> Post Delivery F/u AM CBC  Yuen catheter: For VT  DVT Ppx: ambulation, SCDs, Lovenox 40mg                 Disposition: Anticipate discharge home postpartum Day #2-3    Subjective/Objective     Subjective: Postpartum state    Pain: no  Tolerating PO: yes  Voiding: yes  Flatus: yes  Ambulating: yes  Breastfeeding: Breastfeeding  Chest pain: no  Shortness of breath: no  Leg pain: no  Lochia: wnl    Objective:     Vitals:  Vitals:    24 1900 24 2000 24 2100 24 2331   BP: 126/81 131/77 133/75 116/70   BP Location: Left arm Left arm Left arm Right arm   Pulse: 60 62 65 66   Resp: 18 18 18 18   Temp: 97.9 °F (36.6 °C) 97.8 °F (36.6 °C) 97.7 °F (36.5 °C) 97.6 °F (36.4 °C)   TempSrc: Oral Oral Temporal Oral   SpO2: 97% 99% 96% 98%   Weight:       Height:           Physical Exam:   GEN: appears well, alert and oriented x 3, pleasant and cooperative   CV: Regular rate  RESP: non labored breathing  ABDOMEN: soft, no tenderness, no distention, Uterine fundus firm and non-tender, at the umbilicus, incision c/d/i  EXTREMITIES: non-tender  NEURO Alert and oriented to person, place, and time.       Lab Results   Component Value Date    WBC 9.72 2024    HGB 10.6 (L) 2024    HCT 32.9 (L) 2024    MCV 87 2024    PLT  241 08/16/2024         Nelida Lopez DO  Obstetrics & Gynecology  08/17/24

## 2024-08-18 PROCEDURE — 99024 POSTOP FOLLOW-UP VISIT: CPT | Performed by: STUDENT IN AN ORGANIZED HEALTH CARE EDUCATION/TRAINING PROGRAM

## 2024-08-18 RX ORDER — LIDOCAINE 50 MG/G
1 PATCH TOPICAL DAILY
Status: DISCONTINUED | OUTPATIENT
Start: 2024-08-19 | End: 2024-08-18

## 2024-08-18 RX ORDER — LABETALOL 200 MG/1
200 TABLET, FILM COATED ORAL ONCE
Status: COMPLETED | OUTPATIENT
Start: 2024-08-18 | End: 2024-08-18

## 2024-08-18 RX ORDER — LABETALOL 200 MG/1
400 TABLET, FILM COATED ORAL 2 TIMES DAILY
Status: DISCONTINUED | OUTPATIENT
Start: 2024-08-18 | End: 2024-08-19

## 2024-08-18 RX ORDER — LIDOCAINE 50 MG/G
1 PATCH TOPICAL DAILY
Status: DISCONTINUED | OUTPATIENT
Start: 2024-08-18 | End: 2024-08-21 | Stop reason: HOSPADM

## 2024-08-18 RX ADMIN — LABETALOL HYDROCHLORIDE 200 MG: 200 TABLET, FILM COATED ORAL at 09:05

## 2024-08-18 RX ADMIN — POLYETHYLENE GLYCOL 3350 17 G: 17 POWDER, FOR SOLUTION ORAL at 09:05

## 2024-08-18 RX ADMIN — ACETAMINOPHEN 650 MG: 325 TABLET ORAL at 05:45

## 2024-08-18 RX ADMIN — LABETALOL HYDROCHLORIDE 200 MG: 200 TABLET, FILM COATED ORAL at 11:42

## 2024-08-18 RX ADMIN — ASPIRIN 81 MG CHEWABLE TABLET 81 MG: 81 TABLET CHEWABLE at 19:30

## 2024-08-18 RX ADMIN — LABETALOL HYDROCHLORIDE 400 MG: 200 TABLET, FILM COATED ORAL at 18:11

## 2024-08-18 RX ADMIN — ACETAMINOPHEN 650 MG: 325 TABLET ORAL at 00:11

## 2024-08-18 RX ADMIN — ACETAMINOPHEN 650 MG: 325 TABLET ORAL at 18:11

## 2024-08-18 RX ADMIN — IBUPROFEN 600 MG: 600 TABLET, FILM COATED ORAL at 15:40

## 2024-08-18 RX ADMIN — IBUPROFEN 600 MG: 600 TABLET, FILM COATED ORAL at 21:50

## 2024-08-18 RX ADMIN — IBUPROFEN 600 MG: 600 TABLET, FILM COATED ORAL at 09:05

## 2024-08-18 RX ADMIN — ACETAMINOPHEN 650 MG: 325 TABLET ORAL at 11:45

## 2024-08-18 RX ADMIN — DOXYLAMINE SUCCINATE 25 MG: 25 TABLET ORAL at 20:57

## 2024-08-18 RX ADMIN — LIDOCAINE 1 PATCH: 700 PATCH TOPICAL at 17:01

## 2024-08-18 RX ADMIN — ENOXAPARIN SODIUM 40 MG: 40 INJECTION SUBCUTANEOUS at 09:05

## 2024-08-18 RX ADMIN — IBUPROFEN 600 MG: 600 TABLET, FILM COATED ORAL at 03:07

## 2024-08-18 NOTE — PLAN OF CARE
Problem: POSTPARTUM  Goal: Experiences normal postpartum course  Description: INTERVENTIONS:  - Monitor maternal vital signs  - Assess uterine involution and lochia  Outcome: Progressing  Goal: Appropriate maternal -  bonding  Description: INTERVENTIONS:  - Identify family support  - Assess for appropriate maternal/infant bonding   -Encourage maternal/infant bonding opportunities  - Referral to  or  as needed  Outcome: Progressing  Goal: Establishment of infant feeding pattern  Description: INTERVENTIONS:  - Assess breast/bottle feeding  - Refer to lactation as needed  Outcome: Progressing  Goal: Incision(s), wounds(s) or drain site(s) healing without S/S of infection  Description: INTERVENTIONS  - Assess and document dressing, incision, wound bed, drain sites and surrounding tissue  - Provide patient and family education

## 2024-08-18 NOTE — PROGRESS NOTES
Progress Note - OB/GYN   Sommer Hernandez 38 y.o. female MRN: [id number]  Unit/Bed#:  411-01 Encounter: [id number]      Assessment/Plan    Sommer Hernandez is a 38 y.o.  who is POD #2 s/p RLTCS + BS at 38w1d     Chronic hypertension affecting pregnancy  Assessment & Plan  Continue home lab 200 BID      This patient is Enrolled in the OB Postpartum Blood Pressure Monitoring Program.      History of stroke  Assessment & Plan  Continue LDASA daily  Avoid TXA    * S/P repeat low transverse   Assessment & Plan  Routine postpartum care  Encourage ambulation  Encourage familial bonding  Lactation support as needed  Pain: Motrin/Tylenol around the clock, oxycodone if needed  Postpartum Contraceptive plan: S/p Tubal  Pre-delivery Hgb 10.6 --> 8.8, received venofer  Yuen catheter: passed VT  DVT Ppx: ambulation, SCDs, Lovenox 40mg             Disposition: Anticipate discharge home postpartum Day #2-3      Subjective/Objective     Subjective: Postpartum state    Pain: no  Tolerating PO: yes  Voiding: yes  Flatus: yes  BM: no  Ambulating: yes  Breastfeeding: Breastfeeding  Chest pain: no  Shortness of breath: no  Leg pain: no  Lochia: wnl    Objective:     Vitals:  Vitals:    24 1600 24 1900 24 2250 24 0300   BP: 125/59 126/65 124/79 133/75   BP Location:  Left arm Left arm Left arm   Pulse:  70 65 74   Resp:   18   Temp:  97.9 °F (36.6 °C) 97.8 °F (36.6 °C) 97.9 °F (36.6 °C)   TempSrc:  Temporal Temporal Temporal   SpO2:  99% 97% 97%   Weight:       Height:           Physical Exam:   GEN: appears well, alert and oriented x 3, pleasant and cooperative   CV: Regular rate  RESP: non labored breathing  ABDOMEN: soft, no tenderness, no distention, Uterine fundus firm and non-tender, -1 cm below the umbilicus, incision c/d/i  EXTREMITIES: non-tender  NEURO Alert and oriented to person, place, and time.       Lab Results   Component Value Date    WBC 10.61 (H) 2024     HGB 8.8 (L) 08/17/2024    HCT 27.5 (L) 08/17/2024    MCV 92 08/17/2024     08/17/2024         Nelida Lopez DO  Obstetrics & Gynecology  08/18/24

## 2024-08-18 NOTE — NURSING NOTE
Provided discharge teaching to patient and FOB.  Gave and explained the Save your life magnet as well as the maternal and  packet.  Patient and FOB had no further questions at this time.

## 2024-08-18 NOTE — LACTATION NOTE
This note was copied from a baby's chart.     08/18/24 1550   Lactation Consultation   Reason for Consult 20 m   LATCH Documentation   Latch 1   Audible Swallowing 0   Type of Nipple 2   Comfort (Breast/Nipple) 2   Hold (Positioning) 1   LATCH Score 6   Having latch problems?   (baby too sleepy to latch)   Position(s) Used Cross Cradle   Breasts/Nipples   Left Breast Soft   Right Breast Soft   Left Nipple Everted   Right Nipple Everted   Intervention Hand expression;Other (comment)  (Sommer has been using a manual breast pump and syringe feeding cathi [place])   Breastfeeding Status Yes   Breastfeeding Progress Not yet established   Patient Follow-Up   Lactation Consult Status 2   Follow-Up Type Inpatient;Call as needed   Other OB Lactation Documentation    Additional Problem Noted Sommer has been using a manual breast pump and syringe feeding baby Rakel. Offered option of hand expressing directly into Rakel's mouth to encourage her to want to feed. Placed mom and baby in skin to skin.  Ewelina says feeding Rakel is going better than it had with her older child. Demonstrated close holding skin to skin to promote more opportunity for deeper latching.

## 2024-08-19 LAB
ALBUMIN SERPL BCG-MCNC: 3.1 G/DL (ref 3.5–5)
ALP SERPL-CCNC: 69 U/L (ref 34–104)
ALT SERPL W P-5'-P-CCNC: 14 U/L (ref 7–52)
ANION GAP SERPL CALCULATED.3IONS-SCNC: 5 MMOL/L (ref 4–13)
AST SERPL W P-5'-P-CCNC: 17 U/L (ref 13–39)
BILIRUB SERPL-MCNC: 0.23 MG/DL (ref 0.2–1)
BUN SERPL-MCNC: 10 MG/DL (ref 5–25)
CALCIUM ALBUM COR SERPL-MCNC: 9.1 MG/DL (ref 8.3–10.1)
CALCIUM SERPL-MCNC: 8.4 MG/DL (ref 8.4–10.2)
CHLORIDE SERPL-SCNC: 110 MMOL/L (ref 96–108)
CO2 SERPL-SCNC: 26 MMOL/L (ref 21–32)
CREAT SERPL-MCNC: 0.78 MG/DL (ref 0.6–1.3)
ERYTHROCYTE [DISTWIDTH] IN BLOOD BY AUTOMATED COUNT: 14.6 % (ref 11.6–15.1)
GFR SERPL CREATININE-BSD FRML MDRD: 96 ML/MIN/1.73SQ M
GLUCOSE SERPL-MCNC: 116 MG/DL (ref 65–140)
HCT VFR BLD AUTO: 29.8 % (ref 34.8–46.1)
HGB BLD-MCNC: 9.2 G/DL (ref 11.5–15.4)
MCH RBC QN AUTO: 28.3 PG (ref 26.8–34.3)
MCHC RBC AUTO-ENTMCNC: 30.9 G/DL (ref 31.4–37.4)
MCV RBC AUTO: 92 FL (ref 82–98)
PLATELET # BLD AUTO: 189 THOUSANDS/UL (ref 149–390)
PMV BLD AUTO: 10.9 FL (ref 8.9–12.7)
POTASSIUM SERPL-SCNC: 3.8 MMOL/L (ref 3.5–5.3)
PROT SERPL-MCNC: 6 G/DL (ref 6.4–8.4)
RBC # BLD AUTO: 3.25 MILLION/UL (ref 3.81–5.12)
SODIUM SERPL-SCNC: 141 MMOL/L (ref 135–147)
WBC # BLD AUTO: 8.7 THOUSAND/UL (ref 4.31–10.16)

## 2024-08-19 PROCEDURE — 99024 POSTOP FOLLOW-UP VISIT: CPT | Performed by: STUDENT IN AN ORGANIZED HEALTH CARE EDUCATION/TRAINING PROGRAM

## 2024-08-19 PROCEDURE — 85027 COMPLETE CBC AUTOMATED: CPT

## 2024-08-19 PROCEDURE — 80053 COMPREHEN METABOLIC PANEL: CPT

## 2024-08-19 RX ORDER — LABETALOL 200 MG/1
600 TABLET, FILM COATED ORAL 2 TIMES DAILY
Status: DISCONTINUED | OUTPATIENT
Start: 2024-08-19 | End: 2024-08-19

## 2024-08-19 RX ORDER — LABETALOL 200 MG/1
200 TABLET, FILM COATED ORAL ONCE
Status: COMPLETED | OUTPATIENT
Start: 2024-08-19 | End: 2024-08-19

## 2024-08-19 RX ORDER — HYDRALAZINE HYDROCHLORIDE 20 MG/ML
5 INJECTION INTRAMUSCULAR; INTRAVENOUS ONCE
Status: DISCONTINUED | OUTPATIENT
Start: 2024-08-19 | End: 2024-08-19

## 2024-08-19 RX ORDER — LABETALOL HYDROCHLORIDE 5 MG/ML
40 INJECTION, SOLUTION INTRAVENOUS ONCE
Status: COMPLETED | OUTPATIENT
Start: 2024-08-19 | End: 2024-08-19

## 2024-08-19 RX ORDER — LABETALOL 200 MG/1
600 TABLET, FILM COATED ORAL 2 TIMES DAILY
Status: DISCONTINUED | OUTPATIENT
Start: 2024-08-20 | End: 2024-08-20

## 2024-08-19 RX ORDER — FUROSEMIDE 20 MG
20 TABLET ORAL DAILY
Status: DISCONTINUED | OUTPATIENT
Start: 2024-08-19 | End: 2024-08-21 | Stop reason: HOSPADM

## 2024-08-19 RX ORDER — LABETALOL HYDROCHLORIDE 5 MG/ML
20 INJECTION, SOLUTION INTRAVENOUS ONCE
Status: COMPLETED | OUTPATIENT
Start: 2024-08-19 | End: 2024-08-19

## 2024-08-19 RX ADMIN — POLYETHYLENE GLYCOL 3350 17 G: 17 POWDER, FOR SOLUTION ORAL at 08:02

## 2024-08-19 RX ADMIN — ACETAMINOPHEN 650 MG: 325 TABLET ORAL at 00:30

## 2024-08-19 RX ADMIN — LIDOCAINE 1 PATCH: 700 PATCH TOPICAL at 08:08

## 2024-08-19 RX ADMIN — DOXYLAMINE SUCCINATE 25 MG: 25 TABLET ORAL at 23:53

## 2024-08-19 RX ADMIN — FUROSEMIDE 20 MG: 20 TABLET ORAL at 10:59

## 2024-08-19 RX ADMIN — IBUPROFEN 600 MG: 600 TABLET, FILM COATED ORAL at 08:02

## 2024-08-19 RX ADMIN — LABETALOL HYDROCHLORIDE 300 MG: 200 TABLET, FILM COATED ORAL at 16:17

## 2024-08-19 RX ADMIN — LABETALOL HYDROCHLORIDE 600 MG: 200 TABLET, FILM COATED ORAL at 07:38

## 2024-08-19 RX ADMIN — LABETALOL HYDROCHLORIDE 20 MG: 5 INJECTION, SOLUTION INTRAVENOUS at 00:18

## 2024-08-19 RX ADMIN — ENOXAPARIN SODIUM 40 MG: 40 INJECTION SUBCUTANEOUS at 08:02

## 2024-08-19 RX ADMIN — ACETAMINOPHEN 650 MG: 325 TABLET ORAL at 06:16

## 2024-08-19 RX ADMIN — LABETALOL HYDROCHLORIDE 40 MG: 5 INJECTION, SOLUTION INTRAVENOUS at 00:58

## 2024-08-19 RX ADMIN — ASPIRIN 81 MG CHEWABLE TABLET 81 MG: 81 TABLET CHEWABLE at 19:48

## 2024-08-19 RX ADMIN — IBUPROFEN 600 MG: 600 TABLET, FILM COATED ORAL at 15:15

## 2024-08-19 RX ADMIN — LABETALOL HYDROCHLORIDE 200 MG: 200 TABLET, FILM COATED ORAL at 00:18

## 2024-08-19 RX ADMIN — ACETAMINOPHEN 650 MG: 325 TABLET ORAL at 11:27

## 2024-08-19 RX ADMIN — LABETALOL HYDROCHLORIDE 600 MG: 200 TABLET, FILM COATED ORAL at 23:52

## 2024-08-19 RX ADMIN — IBUPROFEN 600 MG: 600 TABLET, FILM COATED ORAL at 21:00

## 2024-08-19 NOTE — PROGRESS NOTES
Upon bedside shift report pt had a BP done at 0640 that was 170/109 and the BP was cycled for a 15 minute repeat.  Laura Wyatt notified resident Lupe Baird and Arnulfo barger of elevated BPs.

## 2024-08-19 NOTE — LACTATION NOTE
This note was copied from a baby's chart.  CONSULT - LACTATION  Baby Girl Hernandez (Jessica) 3 days female MRN: [id number]    [facility] NURSERY Room / Bed: (N)/(N) Encounter: [id number]    Maternal Information     MOTHER:  Ewelina Hernandez  Maternal Age: 38 y.o.  OB History: # 1 - Date: None, Sex: None, Weight: None, GA: None, Type: None, Apgar1: None, Apgar5: None, Living: None, Birth Comments: None    # 2 - Date: 23, Sex: Female, Weight: 2650 g (5 lb 13.5 oz), GA: 38w0d, Type: , Low Transverse, Apgar1: 8, Apgar5: 9, Living: Living, Birth Comments: None    # 3 - Date: 24, Sex: Female, Weight: 2720 g (5 lb 15.9 oz), GA: 38w1d, Type: , Low Transverse, Apgar1: 8, Apgar5: 9, Living: Living, Birth Comments: None   Previouse breast reduction surgery? No    Lactation history:   Has patient previously breast fed: Yes   How long had patient previously breast fed: about 9 months   Previous breast feeding complications: Other (Comment), Low milk supply (establishing breast feeding then supplemnt when return to work)     Past Surgical History:   Procedure Laterality Date    CA  DELIVERY ONLY N/A 3/23/2023    Procedure:  SECTION ();  Surgeon: Zoraida Flores MD;  Location: [facility];  Service: Obstetrics    CA  DELIVERY ONLY N/A 2024    Procedure:  SECTION () REPEAT;  Surgeon: Zoraida Flores MD;  Location: [facility];  Service: Obstetrics    TOOTH EXTRACTION      wisdom teeth    TUBAL LIGATION Bilateral 2024    Procedure: LIGATION/COAGULATION TUBAL;  Surgeon: Zoraida Flores MD;  Location: [facility];  Service: Obstetrics       Birth information:  YOB: 2025   Time of birth: 2:34 PM   Sex: female   Delivery type: , Low Transverse   Birth Weight: 2720 g (5 lb 15.9 oz)   Percent of Weight Change: -10%     Gestational Age: 38w1d   [unfilled]    Assessment      Breast and nipple assessment: Not assessed at this time.     Assessment: Not assessed at this time. Baby just fed and was sleeping.    Feeding recommendations:  breast feed on demand       24 0850   Lactation Consultation   Reason for Consult 10 m   Breasts/Nipples   Breastfeeding Status Yes   Patient Follow-Up   Lactation Consult Status 2   Follow-Up Type Inpatient;Call as needed   Other OB Lactation Documentation    Additional Problem Noted Sommer reports that feeding went well overnight. She was using a manual hand pump then bottle feeding it to the baby. Reports higher volumes of breastmilk pumped overnight.  She states that baby is progressively doing better this morning at the breast and hasn't been needing to pump. Encouraged to do skin to skin with baby to wake her up and to feed on demand. Encouraged to call for next feeding for LATCH check.     Met with Sommer and her mother. She reports that her baby girl, Rakel has been progressively doing better at the breast. Sommer reports she was using the manual hand pump and bottle feeding overnight due to higher volumes of milk. Sommer states that baby is breastfeeding better today. She expresses no concerns and states that breastfeeding is not painful. Her plan is to breastfeed.    Discussed offering both breasts multiple times during a feed and to let baby regulate how much she wants. Discussed not to set a time limit on at the breast and that feedings can be longer than 15 minutes. Sommer is encouraged to breastfeed on demand (8-12 times a day) and watch for early hunger cues- fluttering eyes/waking from sleep, rooting (open mouth and turns head), bringing hands to mouth, sucking on tongue or hands, tight fists held at center of chest and crying. Encouraged to watch for when the baby is getting enough- when the sucking starts to slow down. Goes from rhythmic sucking to more flutter sucking. Baby starts to face away from the breast and  lets go of the nipple. Baby falls asleep and arms nice and relaxed. Encouraged to place baby skin to skin at 2-2.5 hour tre to encourage more frequent feedings. She is encouraged to log baby's feedings and her I/O's.    Sommer is encouraged to call at next feed to further assess baby's latch.          Ansley Ellsworth 8/19/2024 9:10 AM

## 2024-08-19 NOTE — LACTATION NOTE
This note was copied from a baby's chart.     08/19/24 1630   Lactation Consultation   Reason for Consult 10 m   Maternal Information   Has mother  before? Yes   How long did the the mother previously breastfeed? about 9 months   Breasts/Nipples   Breastfeeding Status Yes   Breastfeeding Progress Breastfeeding well;Not yet established  (per mom)   Other OB Lactation Tools   Feeding Devices Syringe   Breast Pump   Pump 1   Pump Review/Education Setup, frequency, and cleaning;Milk storage   Patient Follow-Up   Lactation Consult Status 2   Follow-Up Type Inpatient;Call as needed   Other OB Lactation Documentation    Additional Problem Noted Checked in with Gali, she reports that breastfeeding is going well. Was unable to assess LATCH. Encouraged to hand express or manual hand pump after feeding for a couple of minutes to give baby extra volume d/t 10% wt loss. Mom reports she is getting large amounts of milk when she uses the manual hand pump. Instructed how to use syringes with both parents.     Sommer is continuing to breastfeed on demand. She states that baby is doing better at breastfeeding today than yesterday. She reports baby is stooling and voiding adequately. She is encouraged to hand express or manual hand pump for a couple of minutes after feeds to provide baby with extra supplementation due to 10% weight loss. She reports she is getting large amounts of milk when she uses the hand pump. Reviewed how to use oral syringes and how to feed baby using this alternative feeding method. Encouraged to continue to document feedings, I/O's in the feeding log including when they supplement using the oral syringe. Encouraged to communicate with staff about large amounts of milk so it can be properly labeled and stored accordingly.

## 2024-08-19 NOTE — PROGRESS NOTES
"Progress Note - OB/GYN   Sommer Hernandez 38 y.o. female MRN: [id number]  Unit/Bed#: -01 Encounter: [id number]    Assessment:  Post partum Day #03 s/p RLTCS + Bilateral Salpingectomy, stable, baby in the room.   Pt presented two reading os severe BP which was received labetalol IV. Labetalol dosage increase to 600 mg BID.    Plan:  Chronic hypertension affecting pregnancy  Assessment & Plan  Home lab 200 BID  Increased Labetalol dosage to 600mg BID    This patient is Enrolled in the OB Postpartum Blood Pressure Monitoring Program.    History of stroke  Assessment & Plan  Continue LDASA daily  Avoid TXA    * S/P repeat low transverse   Assessment & Plan  Routine postpartum care  Encourage ambulation  Encourage familial bonding  Lactation support as needed  Pain: Motrin/Tylenol around the clock, oxycodone if needed  Postpartum Contraceptive plan: S/p Tubal  Pre-delivery Hgb 10.6 --> 8.8, received venofer  Yuen catheter: passed VT  DVT Ppx: ambulation, SCDs, Lovenox 40mg        Subjective/Objective   Chief Complaint:     Post delivery. Patient is doing well. Lochia WNL. Pain well controlled.     Subjective:     Pain: yes, cramping, improved with meds  Tolerating PO: yes  Voiding: yes  Flatus: yes  Ambulating: yes  Chest pain: no  Shortness of breath: no  Leg pain: no  Lochia: minimal    Objective:     Vitals: /80 (BP Location: Left arm)   Pulse 57   Temp 98.3 °F (36.8 °C) (Oral)   Resp 20   Ht 5' 1\" (1.549 m)   Wt 83.5 kg (184 lb)   LMP 2023 (Exact Date)   SpO2 97%   Breastfeeding Yes   BMI 34.77 kg/m²     I/O          0701   0700  07 0700    Urine (mL/kg/hr) 1950 (1)     Total Output      Net -                   Lab Results   Component Value Date    WBC 10.61 (H) 2024    HGB 8.8 (L) 2024    HCT 27.5 (L) 2024    MCV 92 2024     2024       Physical Exam:     Gen: AAOx3, NAD  CV: no acute distress  Lungs: no " acute distress  Abd: Soft, non-tender, non-distended, no rebound or guarding  Uterine fundus firm and non-tender, 02 cm below the umbilicus. Incision c/d/I  Ext: Non tender    Arnulfo Haines MD  OBGYN PGY-1  8/19/2024  5:31 AM

## 2024-08-20 PROCEDURE — 99024 POSTOP FOLLOW-UP VISIT: CPT | Performed by: OBSTETRICS & GYNECOLOGY

## 2024-08-20 PROCEDURE — 88302 TISSUE EXAM BY PATHOLOGIST: CPT | Performed by: STUDENT IN AN ORGANIZED HEALTH CARE EDUCATION/TRAINING PROGRAM

## 2024-08-20 RX ORDER — LABETALOL 200 MG/1
600 TABLET, FILM COATED ORAL EVERY 8 HOURS SCHEDULED
Status: DISCONTINUED | OUTPATIENT
Start: 2024-08-20 | End: 2024-08-21 | Stop reason: HOSPADM

## 2024-08-20 RX ADMIN — LABETALOL HYDROCHLORIDE 600 MG: 200 TABLET, FILM COATED ORAL at 16:12

## 2024-08-20 RX ADMIN — FUROSEMIDE 20 MG: 20 TABLET ORAL at 10:18

## 2024-08-20 RX ADMIN — IBUPROFEN 600 MG: 600 TABLET, FILM COATED ORAL at 21:06

## 2024-08-20 RX ADMIN — LABETALOL HYDROCHLORIDE 600 MG: 200 TABLET, FILM COATED ORAL at 23:58

## 2024-08-20 RX ADMIN — ENOXAPARIN SODIUM 40 MG: 40 INJECTION SUBCUTANEOUS at 08:28

## 2024-08-20 RX ADMIN — POLYETHYLENE GLYCOL 3350 17 G: 17 POWDER, FOR SOLUTION ORAL at 08:28

## 2024-08-20 RX ADMIN — IBUPROFEN 600 MG: 600 TABLET, FILM COATED ORAL at 13:29

## 2024-08-20 RX ADMIN — IBUPROFEN 600 MG: 600 TABLET, FILM COATED ORAL at 05:16

## 2024-08-20 RX ADMIN — ASPIRIN 81 MG CHEWABLE TABLET 81 MG: 81 TABLET CHEWABLE at 20:20

## 2024-08-20 RX ADMIN — LABETALOL HYDROCHLORIDE 600 MG: 200 TABLET, FILM COATED ORAL at 08:28

## 2024-08-20 NOTE — UTILIZATION REVIEW
Initial Clinical Review    Admission: Date/Time/Statement:   Admission Orders (From admission, onward)       Ordered        24 1059  Inpatient Admission  Once                          Orders Placed This Encounter   Procedures    Inpatient Admission     Standing Status:   Standing     Number of Occurrences:   1     Order Specific Question:   Level of Care     Answer:   Med Surg [16]     Order Specific Question:   Estimated length of stay     Answer:   More than 2 Midnights     Order Specific Question:   Certification     Answer:   I certify that inpatient services are medically necessary for this patient for a duration of greater than two midnights. See H&P and MD Progress Notes for additional information about the patient's course of treatment.     ED Arrival Information       Patient not seen in ED                           Initial Presentation: 38 y.o. female HX cHTN , HX stroke  admitted at 38w1d for scheduled  delivery for chronic hypertension on home labetalol 200 BID .   EXAM  Category 1 FHT [place] quiet     section repeat ww BS   Findings  Viable female  on 2024 at 1434  APGARs of 8 and 9 at 1 and 5 minutes.   Birthwt = 5lb 15oz.   Required ongoing stay due to need for antiHTN medication escalation during post partum period   Date: 2024   Day 2:   POD # 1 s/p repeat C/S and BS, c/b cHTN  Meeting post partum recovery milestone goals  cHTN: Continue labetalol 200mg BID. Normotensive since delivery. Enroll in home BP monitoring program.   Continue LDASA daily     DATE  2024  DAY 3  POD # 2 s/p repeat C/S and BS, c/b cHTN  Meeting post partum recovery milestone goals  cHTN: /95 just immediately before labetalol dose, cont monitoring    DATE 2024  DAY 4  POD # 3 s/p repeat C/S and BS, c/b cHTN   Requiring titration of antihypertensive medication  - Increased on  to 600 mg BID, add lasix 20 mg x 5 days    DATE  2024  DAY 5  POD# 4 s/p repeat C/S  and BS, c/b cHTN with need for antihypertensive medication escalation  Overnight BP's intermittently severe, labetalol dose increased    Most recent BP normal. No other concerning sx  labetalol now increased to 600 mg TID,   already on lasix x 5 days. No other concerning sx for severe preeclampsia. Discussed recommendation for continued inpatient stay. Discussed PPBPP upon discharge    Triage Vitals   Temperature Pulse Respirations Blood Pressure SpO2 Pain Score   08/16/24 1054 08/16/24 1051 08/16/24 1054 08/16/24 1051 08/16/24 1051 08/16/24 1054   (!) 97.4 °F (36.3 °C) (!) 107 18 122/71 99 % No Pain     Weight (last 2 days)       None            Vital Signs (last 3 days)       Date/Time Temp Pulse Resp BP MAP (mmHg) SpO2 O2 Device Cardiac (WDL) Patient Position - Orthostatic VS Pain    08/20/24 0751 97.9 °F (36.6 °C) 86 18 141/84 -- 98 % None (Room air) WDL -- 2    08/20/24 0600 97.6 °F (36.4 °C) 88 18 146/88 -- 99 % None (Room air) -- Sitting --    08/20/24 0516 -- -- -- -- -- -- -- -- -- 1    08/20/24 0400 97.5 °F (36.4 °C) 62 18 145/88 -- 97 % None (Room air) -- Sitting --    08/20/24 0200 -- -- -- 149/96 -- -- -- -- -- --    08/20/24 0000 -- -- -- 152/88 -- -- -- -- -- --    08/19/24 2345 -- -- -- 175/99 -- -- -- -- Lying --    08/19/24 2300 -- -- -- -- -- -- None (Room air) WDL -- 2    08/19/24 2145 -- 66 18 145/79 -- -- -- -- Lying --    08/19/24 2130 -- 60 18 143/89 -- -- -- -- Lying --    08/19/24 2100 -- -- -- -- -- -- -- -- -- 2    08/19/24 1930 97.8 °F (36.6 °C) 68 18 140/93 -- 97 % None (Room air) -- Sitting --    08/19/24 1730 -- 77 18 125/85 -- -- -- -- -- --    08/19/24 1530 -- 64 18 134/84 96 98 % None (Room air) WDL Sitting No Pain    08/19/24 1515 -- -- -- -- -- -- -- -- -- Med Not Given for Pain - for MAR use only    08/19/24 1330 98.1 °F (36.7 °C) 76 18 140/78 -- 98 % -- -- -- --    08/19/24 1130 98 °F (36.7 °C) 76 -- 146/92 -- 98 % -- -- -- --    08/19/24 1127 -- -- -- -- -- -- -- -- -- Med Not  Given for Pain - for MAR use only    08/19/24 1030 98 °F (36.7 °C) 71 -- 149/83 -- 98 % -- -- Sitting --    08/19/24 0930 98.1 °F (36.7 °C) 75 -- 140/83 -- 98 % None (Room air) -- Sitting --    08/19/24 0830 -- 67 -- 136/77 -- 97 % None (Room air) -- Sitting --    08/19/24 0802 -- -- -- -- -- -- -- -- -- Med Not Given for Pain - for MAR use only    08/19/24 0729 -- -- -- 137/76 -- -- None (Room air) WDL -- No Pain    08/19/24 0650 -- -- -- 169/91 -- -- -- -- -- --    08/19/24 0616 -- -- -- -- -- -- -- -- -- 2    08/19/24 0540 -- 63 -- 157/83 -- -- -- -- -- --    08/19/24 0440 -- 57 -- 140/80 -- -- -- -- -- --    08/19/24 0410 -- 63 -- 138/83 -- -- -- -- -- --    08/19/24 0340 -- 64 -- 126/67 -- -- -- -- -- --    08/19/24 0320 -- 64 -- 139/73 -- -- -- -- -- --    08/19/24 0305 -- 64 -- 124/71 -- -- -- -- -- --    08/19/24 0250 -- 72 -- 117/72 -- -- -- -- -- --    08/19/24 0235 -- 79 -- 122/66 -- -- -- -- -- --    08/19/24 0220 -- 63 -- 151/84 -- -- -- -- -- --    08/19/24 0210 -- 59 -- 144/82 -- -- -- -- -- --    08/19/24 0200 -- 69 -- 143/76 -- -- -- -- -- --    08/19/24 0150 -- 59 -- 147/84 -- -- -- -- -- --    08/19/24 0140 -- 61 -- 142/78 -- -- -- -- -- --    08/19/24 0130 -- 61 -- 143/83 -- -- -- -- -- --    08/19/24 0120 -- 64 -- 143/79 -- -- -- -- -- --    08/19/24 0105 -- 58 -- 160/84 -- -- -- -- -- --    08/19/24 0050 -- 62 -- 163/83 -- -- -- -- -- --    08/19/24 0035 -- -- -- -- -- -- None (Room air) WDL -- --    08/19/24 0030 -- -- -- -- -- -- -- -- -- 3    08/19/24 0018 -- 68 -- 176/94 -- -- -- -- -- --    08/19/24 0000 -- -- -- 180/92 -- -- -- -- Lying --    08/18/24 2322 -- -- -- 160/86 -- -- -- -- Lying - Orthostatic VS --    08/18/24 2321 98.3 °F (36.8 °C) 58 20 156/94 -- 97 % None (Room air) -- Lying --    08/18/24 2150 -- -- -- -- -- -- -- -- -- 3    08/18/24 1926 98 °F (36.7 °C) 75 16 130/73 -- 99 % None (Room air) -- Lying --    08/18/24 1811 -- -- -- -- -- -- -- -- -- 2    08/18/24 1540 -- -- --  -- -- -- -- -- -- 3    08/18/24 1535 -- -- -- -- -- -- None (Room air) WDL -- --    08/18/24 1530 98.4 °F (36.9 °C) 67 18 140/70 -- 97 % None (Room air) -- Sitting --    08/18/24 1200 -- -- -- 144/88 -- -- -- -- -- --    08/18/24 1100 98.1 °F (36.7 °C) 78 18 144/88 -- 96 % None (Room air) -- Sitting 1    08/18/24 0905 -- -- -- -- -- -- -- -- -- 3    08/18/24 0855 -- -- -- -- -- -- -- WDL -- 3    08/18/24 0852 -- -- -- 141/95 -- -- -- -- -- --    08/18/24 0700 98.5 °F (36.9 °C) 69 18 129/84 -- 98 % None (Room air) -- Sitting --    08/18/24 0545 -- -- -- -- -- -- -- -- -- 6 08/18/24 0307 -- -- -- -- -- -- -- -- -- 5    08/18/24 0300 97.9 °F (36.6 °C) 74 18 133/75 90 97 % None (Room air) -- Sitting --    08/18/24 0011 -- -- -- -- -- -- -- -- -- 6 08/17/24 2322 -- -- -- -- -- -- None (Room air) WDL -- --    08/17/24 2250 97.8 °F (36.6 °C) 65 16 124/79 -- 97 % None (Room air) -- Lying --    08/17/24 2101 -- -- -- -- -- -- -- -- -- 5    08/17/24 1900 97.9 °F (36.6 °C) 70 18 126/65 -- 99 % None (Room air) -- Sitting --    08/17/24 1600 -- -- -- 125/59 -- -- -- -- -- --    08/17/24 1529 97.5 °F (36.4 °C) 71 18 125/59 87 97 % None (Room air) -- Sitting --    08/17/24 1500 -- -- -- -- -- -- None (Room air) WDL -- No Pain    08/17/24 1125 99.1 °F (37.3 °C) 78 16 139/71 86 97 % None (Room air) -- Lying --    08/17/24 0802 -- -- -- -- -- -- None (Room air) WDL -- No Pain    08/17/24 0800 98.4 °F (36.9 °C) 67 16 114/73 -- -- -- -- -- --    08/17/24 0615 -- -- -- -- -- -- -- -- -- 3    08/17/24 0327 98.1 °F (36.7 °C) 68 18 113/68 -- 97 % None (Room air) -- Sitting --    08/17/24 0015 -- -- -- -- -- -- -- -- -- 2              Pertinent Labs/Diagnostic Test Results:   Radiology:  No orders to display     Cardiology:  No orders to display     GI:  No orders to display           Results from last 7 days   Lab Units 08/19/24  1115 08/17/24  0547 08/16/24  1112   WBC Thousand/uL 8.70 10.61* 9.72   HEMOGLOBIN g/dL 9.2* 8.8* 10.6*  "  HEMATOCRIT % 29.8* 27.5* 32.9*   PLATELETS Thousands/uL 189 175 241         Results from last 7 days   Lab Units 24  1115   SODIUM mmol/L 141   POTASSIUM mmol/L 3.8   CHLORIDE mmol/L 110*   CO2 mmol/L 26   ANION GAP mmol/L 5   BUN mg/dL 10   CREATININE mg/dL 0.78   EGFR ml/min/1.73sq m 96   CALCIUM mg/dL 8.4     Results from last 7 days   Lab Units 24  1115   AST U/L 17   ALT U/L 14   ALK PHOS U/L 69   TOTAL PROTEIN g/dL 6.0*   ALBUMIN g/dL 3.1*   TOTAL BILIRUBIN mg/dL 0.23         Results from last 7 days   Lab Units 24  1115   GLUCOSE RANDOM mg/dL 116             No results found for: \"BETA-HYDROXYBUTYRATE\"                   ED Treatment-Medication Administration - No Administrations Displayed (No Start Event Found)       None            Past Medical History:   Diagnosis Date    Carotid stenosis, asymptomatic, left 2021 There is <50% stenosis noted in the internal carotid artery on Left. Plaque is homogenous and smooth. 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    Thyroid nodule 04/10/2018    5/26/20 US completed and does not recommend followup. Stable since      Present on Admission:   Sterilization education   Chronic hypertension affecting pregnancy      Admitting Diagnosis: Encounter for  delivery without indication [O82]  Age/Sex: 38 y.o. female  Admission Orders:  Continuous fetal HR monitoring  Serial BP monitoring    Scheduled Medications:  aspirin, 81 mg, Oral, Daily  enoxaparin, 40 mg, Subcutaneous, Q24H ALYSHA  furosemide, 20 mg, Oral, Daily  ibuprofen, 600 mg, Oral, Q6H  labetalol, 600 mg, Oral, Q8H ALYSHA  lidocaine, 1 patch, Topical, Daily  polyethylene glycol, 17 g, Oral, Daily    8/18 x1 PO = Labetalol (NORMODYNE) tablet 200 mg  Dose: 200 mg  Freq: Once Route: PO  Start: 24 1145 End: 24 1142     IV x1=labetalol " (NORMODYNE) injection 20 mg  Dose: 20 mg  Freq: Once Route: IV  Start: 08/19/24 0015 End: 08/19/24 0018 8/19 IV x1=  labetalol (NORMODYNE) injection 40 mg  Dose: 40 mg  Freq: Once Route: IV  Start: 08/19/24 0100 End: 08/19/24 0058     8/18 x1 & 8/19 x1 PO =labetalol (NORMODYNE) tablet 200 mg  Dose: 200 mg  Freq: Once Route: PO  Start: 08/19/24 0015 End: 08/19/24 0018    8/16 x1, 8/17 x2, 8/18 x1 PO = labetalol (NORMODYNE) tablet 200 mg  Dose: 200 mg  Freq: 2 times daily Route: PO  Start: 08/16/24 1800 End: 08/18/24 1128    Continuous IV Infusions:  lactated ringers, 125 mL/hr, Intravenous, Continuous      PRN Meds:  benzocaine-menthol-lanolin-aloe, 1 Application, Topical, Q6H PRN  calcium carbonate, 1,000 mg, Oral, Daily PRN  diphenhydrAMINE, 25 mg, Oral, Q6H PRN  doxylamine, 25 mg, Oral, HS PRN  hydrocortisone, 1 Application, Topical, Daily PRN  ondansetron, 4 mg, Intravenous, Q8H PRN  oxyCODONE, 5 mg, Oral, Q4H PRN  simethicone, 80 mg, Oral, 4x Daily PRN  witch hazel-glycerin, 1 Pad, Topical, Q4H PRN            Network Utilization Review Department  ATTENTION: Please call with any questions or concerns to [phone] and carefully listen to the prompts so that you are directed to the right person. All voicemails are confidential.   For Discharge needs, contact Care Management DC Support Team at [phone] opt. [phone]  Send all requests for admission clinical reviews, approved or denied determinations and any other requests to dedicated fax number below belonging to the campus where the patient is receiving treatment. List of dedicated fax numbers for the Facilities:  FACILITY NAME UR FAX NUMBER   ADMISSION DENIALS (Administrative/Medical Necessity) [phone]   DISCHARGE SUPPORT TEAM (NETWORK) [phone]   PARENT CHILD HEALTH (Maternity/NICU/Pediatrics) [phone]   [facility] [phone]   [facility] [phone]   [facility]  [place] [phone]   [facility] [phone]   [facility] [phone]   [facility] [phone]   [facility] [phone]   [facility] [phone]   [facility] [phone]   [facility] [phone]   [facility] [phone]   [facility] [phone]

## 2024-08-20 NOTE — PROGRESS NOTES
"Progress Note - OB/GYN   Sommer Hernandez 38 y.o. female MRN: [id number]  Unit/Bed#: -01 Encounter: [id number]    Assessment:  Post partum Day #04 s/p RLTCS + Bilateral Salpingectomy, stable, baby in the room. Pt had one non sustained severe range PB last night.      Plan:  Chronic hypertension affecting pregnancy  Assessment & Plan  Home lab 200 BID  Increased Labetalol dosage to 600/300/600mg, Lasix 20mg qD for 05 days (starting )  Increased Labetalol dosage to 600md TID, Lasix 20md 2nd day (24)    This patient is Enrolled in the OB Postpartum Blood Pressure Monitoring Program.    Systolic (24hrs), Av , Min:125 , Max:175   Diastolic (24hrs), Av, Min:76, Max:99        History of stroke  Assessment & Plan  Continue LDASA daily  Avoid TXA    * S/P repeat low transverse   Assessment & Plan  Routine postpartum care  Encourage ambulation  Encourage familial bonding  Lactation support as needed  Pain: Motrin/Tylenol around the clock, oxycodone if needed  Postpartum Contraceptive plan: S/p Tubal  Pre-delivery Hgb 10.6 --> 8.8, received venofer  Yuen catheter: passed VT  DVT Ppx: ambulation, SCDs, Lovenox 40mg        Subjective/Objective   Chief Complaint:     Post delivery. Patient is doing well. Lochia WNL. Pain well controlled.     Subjective:     Pain: yes, cramping, improved with meds  Tolerating PO: yes  Voiding: yes  Flatus: yes  Ambulating: yes  Chest pain: no  Shortness of breath: no  Leg pain: no  Lochia: minimal    Objective:     Vitals: /88 (BP Location: Left arm)   Pulse 88   Temp 97.6 °F (36.4 °C) (Temporal)   Resp 18   Ht 5' 1\" (1.549 m)   Wt 83.5 kg (184 lb)   LMP 2023 (Exact Date)   SpO2 99%   Breastfeeding Yes   BMI 34.77 kg/m²     I/O       None            Lab Results   Component Value Date    WBC 8.70 2024    HGB 9.2 (L) 2024    HCT 29.8 (L) 2024    MCV 92 2024     2024       Physical Exam:     Gen: " AAOx3, NAD  CV: no acute distress  Lungs: no acute distress  Abd: Soft, non-tender, non-distended, no rebound or guarding  Uterine fundus firm and non-tender, at the level of the umbilicus. Incision c/d/I  Ext: Non tender    Arnulfo Haines MD  OBGYN PGY-1  8/20/2024  7:23 AM

## 2024-08-20 NOTE — LACTATION NOTE
This note was copied from a baby's chart.  CONSULT - LACTATION  Baby Girl Hernandez (Jessica) 4 days female MRN: [id number]    [facility] NURSERY Room / Bed: (N)/(N) Encounter: [id number]    Maternal Information     MOTHER:  Ewelina Hernandez  Maternal Age: 38 y.o.  OB History: # 1 - Date: None, Sex: None, Weight: None, GA: None, Type: None, Apgar1: None, Apgar5: None, Living: None, Birth Comments: None    # 2 - Date: 23, Sex: Female, Weight: 2650 g (5 lb 13.5 oz), GA: 38w0d, Type: , Low Transverse, Apgar1: 8, Apgar5: 9, Living: Living, Birth Comments: None    # 3 - Date: 24, Sex: Female, Weight: 2720 g (5 lb 15.9 oz), GA: 38w1d, Type: , Low Transverse, Apgar1: 8, Apgar5: 9, Living: Living, Birth Comments: None   Previouse breast reduction surgery? No    Lactation history:   Has patient previously breast fed: Yes   How long had patient previously breast fed: about 9 months   Previous breast feeding complications: Other (Comment), Low milk supply (establishing breast feeding then supplemnt when return to work)     Past Surgical History:   Procedure Laterality Date    PA  DELIVERY ONLY N/A 3/23/2023    Procedure:  SECTION ();  Surgeon: Zoraida Flores MD;  Location: [facility];  Service: Obstetrics    PA  DELIVERY ONLY N/A 2024    Procedure:  SECTION () REPEAT;  Surgeon: Zoraida Flores MD;  Location: [facility];  Service: Obstetrics    TOOTH EXTRACTION      wisdom teeth    TUBAL LIGATION Bilateral 2024    Procedure: LIGATION/COAGULATION TUBAL;  Surgeon: Zoraida Flores MD;  Location: [facility];  Service: Obstetrics       Birth information:  YOB: 2025   Time of birth: 2:34 PM   Sex: female   Delivery type: , Low Transverse   Birth Weight: 2720 g (5 lb 15.9 oz)   Percent of Weight Change: -10%     Gestational Age: 38w1d   [unfilled]    Assessment  "    Breast and nipple assessment: engorged breast    Sasabe Assessment: sleepy    Feeding assessment: no latch     24 1300   Lactation Consultation   Reason for Consult 10 m   Breasts/Nipples   Left Breast Engorged;Filling;Tender   Right Breast Engorged;Filling;Tender   Intervention Breast pump;Breast shells;Other (comment)  (Nursing pads for leaking)   Breastfeeding Progress Latch issues   Other OB Lactation Tools   Feeding Devices Bottle   Patient Follow-Up   Lactation Consult Status 2   Follow-Up Type Inpatient;Call as needed   Other OB Lactation Documentation    Additional Problem Noted Ewelina switched to using a bottle to feed her baby expressed breast milk  She is able to capture about an ounce when pumping for comfort. She reports a tender firm area that resolved with warm compress last evening. Encouraged offering both breasts at a feeding with breast compression while baby is suckling, and then offering bottles at the end of feeding at the breast rather than saving them for home use as related to infant weight loss to off set the discrepancy. Reviewed common causes for blocked ducts. Suggested use of dietary aid: sunflower lecithin granuals, if struggles persist. Breast shells and nursing pads given for comfort measures as milk is \"coming in.\" No latch observed at this time, but Ewelina reports it is becoming easier.       Feeding recommendations:  supplement with pumped breast milk via bottle and nipple after offering both breasts.    Encouraged parents to call for assistance, questions, and concerns about breastfeeding.  Extension provided.      Ursula Moran RN 2024 1:32 PM  "

## 2024-08-20 NOTE — UTILIZATION REVIEW
"NOTIFICATION OF INPATIENT ADMISSION   MATERNITY/DELIVERY AUTHORIZATION REQUEST   SERVICING FACILITY:   [facility]  Parent Child Health - L&D, [place], NICU  [address] [place] [address]  Tax ID: [id number]  NPI: [id number] ATTENDING PROVIDER:  Attending Name and NPI#: Zoraida Flores Md [[id number]]  Address: [address] [place] [address]  Phone: [phone]     ADMISSION INFORMATION:  Place of Service: Inpatient [facility]  Place of Service Code: 21  Inpatient Admission Date/Time: 24 10:59 AM  Discharge Date/Time: No discharge date for patient encounter.  Admitting Diagnosis Code/Description:  Encounter for  delivery without indication [O82]   Mother: Sommer Hernandez 1985 Estimated Date of Delivery: 24  Delivering clinician: Zoraida Flores   OB History          3    Para   2    Term   2       0    AB   1    Living   2         SAB   0    IAB   0    Ectopic   0    Multiple   0    Live Births   2               [place] Name & MRN:   Information for the patient's :  Mary, Baby Girl (Sommer) [[id number]]    Delivery Information:  Sex: female  Delivered 2024 2:34 PM by , Low Transverse; Gestational Age: 38w1d     Measurements:  Weight: 5 lb 15.9 oz (2720 g);  Height: 19\"    APGAR 1 minute 5 minutes 10 minutes   Totals: 8 9       UTILIZATION REVIEW CONTACT:  Radha Medrano, Utilization   Network Utilization Review Department  Phone: [phone]  Fax [phone]  Email: [email or web address]  Contact for approvals/pending authorizations, clinical reviews, and discharge.   PHYSICIAN ADVISORY SERVICES:  Medical Necessity Denial & Erma-rg-Nzsy Review  Phone: [phone]  Fax: [phone]  Email: [email or web address]   DISCHARGE SUPPORT TEAM:  For Patients Discharge Needs & Updates  Phone: [phone] opt. 2 Fax: [phone]  Email: " [email or web address]

## 2024-08-21 ENCOUNTER — TRANSITIONAL CARE MANAGEMENT (OUTPATIENT)
Dept: FAMILY MEDICINE CLINIC | Facility: CLINIC | Age: 39
End: 2024-08-21

## 2024-08-21 VITALS
BODY MASS INDEX: 34.74 KG/M2 | SYSTOLIC BLOOD PRESSURE: 150 MMHG | HEART RATE: 62 BPM | TEMPERATURE: 97.8 F | HEIGHT: 61 IN | OXYGEN SATURATION: 98 % | RESPIRATION RATE: 18 BRPM | DIASTOLIC BLOOD PRESSURE: 88 MMHG | WEIGHT: 184 LBS

## 2024-08-21 PROCEDURE — 99024 POSTOP FOLLOW-UP VISIT: CPT

## 2024-08-21 PROCEDURE — 88307 TISSUE EXAM BY PATHOLOGIST: CPT | Performed by: PATHOLOGY

## 2024-08-21 RX ORDER — FUROSEMIDE 20 MG
20 TABLET ORAL DAILY
Qty: 3 TABLET | Refills: 0 | Status: SHIPPED | OUTPATIENT
Start: 2024-08-21 | End: 2024-08-24

## 2024-08-21 RX ORDER — LABETALOL 300 MG/1
600 TABLET, FILM COATED ORAL EVERY 8 HOURS SCHEDULED
Qty: 180 TABLET | Refills: 0 | Status: SHIPPED | OUTPATIENT
Start: 2024-08-21 | End: 2024-09-20

## 2024-08-21 RX ORDER — ASPIRIN 81 MG/1
81 TABLET, CHEWABLE ORAL DAILY
Qty: 90 TABLET | Refills: 0 | Status: SHIPPED | OUTPATIENT
Start: 2024-08-21

## 2024-08-21 RX ORDER — IBUPROFEN 600 MG/1
600 TABLET, FILM COATED ORAL EVERY 6 HOURS
Qty: 30 TABLET | Refills: 0 | Status: SHIPPED | OUTPATIENT
Start: 2024-08-21

## 2024-08-21 RX ADMIN — ENOXAPARIN SODIUM 40 MG: 40 INJECTION SUBCUTANEOUS at 08:06

## 2024-08-21 RX ADMIN — LABETALOL HYDROCHLORIDE 600 MG: 200 TABLET, FILM COATED ORAL at 08:06

## 2024-08-21 RX ADMIN — FUROSEMIDE 20 MG: 20 TABLET ORAL at 08:06

## 2024-08-21 RX ADMIN — POLYETHYLENE GLYCOL 3350 17 G: 17 POWDER, FOR SOLUTION ORAL at 08:06

## 2024-08-21 NOTE — PLAN OF CARE

## 2024-08-21 NOTE — PROGRESS NOTES
"Progress Note - OB/GYN  Sommer Hernandez 38 y.o. female MRN: [id number]  Unit/Bed#:  411-01 Encounter: [id number]    Assessment and Plan     Sommer Hernandez is a patient of: [facility] She is PPD# 5 s/p  repeat  section, low transverse incision with bilateral salpingectomy. Recovering well and is stable       Chronic hypertension affecting pregnancy  Assessment & Plan  Home lab 200 BID  Increased Labetalol dosage to 600/300/600mg, Lasix 20mg qD for 05 days (starting )  Increased Labetalol dosage to 600md TID, Lasix 20md 2nd day (24)    This patient is Enrolled in the OB Postpartum Blood Pressure Monitoring Program.    Systolic (24hrs), Av , Min:125 , Max:175   Diastolic (24hrs), Av, Min:76, Max:99        History of stroke  Assessment & Plan  Continue LDASA daily  Avoid TXA    * S/P repeat low transverse   Assessment & Plan  Routine postpartum care  Encourage ambulation  Encourage familial bonding  Lactation support as needed  Pain: Motrin/Tylenol around the clock, oxycodone if needed  Postpartum Contraceptive plan: S/p Tubal  Pre-delivery Hgb 10.6 --> 8.8, received venofer  Yuen catheter: passed VT  DVT Ppx: ambulation, SCDs, Lovenox 40mg        Disposition    - Anticipate discharge home on PPD# 5      Subjective/Objective     Chief Complaint: Postpartum State     Subjective:    Sommer Hernandez is POD#5 s/p  repeat  section, low transverse incision. She has no current complaints.  Her blood pressures have dmitry stable.Pain is well controlled. She is recovering well and is stable.     Breastfeeding:  yes    Tolerating PO: yes  Nausea or Vomiting: no  Voiding: yes  Flatus: yes; has had bowel movement.   Ambulating: yes  Chest pain: no  Shortness of breath: no  Leg pain: no  Lochia: appropriate     Vitals:   /84 (BP Location: Right arm)   Pulse 73   Temp 98.1 °F (36.7 °C) (Temporal)   Resp 18   Ht 5' 1\" (1.549 m)   Wt 83.5 kg (184 lb)   LMP " 11/23/2023 (Exact Date)   SpO2 98%   Breastfeeding Yes   BMI 34.77 kg/m²       Intake/Output Summary (Last 24 hours) at 8/21/2024 0655  Last data filed at 8/20/2024 0740  Gross per 24 hour   Intake --   Output 500 ml   Net -500 ml       Invasive Devices       None                   Physical Exam:   GEN: Sommer Hernandez appears well, alert and oriented x 3, pleasant and cooperative   CARDIO: RRR, no murmurs or rubs  RESP:  CTAB, no wheezes or rales  ABDOMEN: soft, no tenderness, no distention, fundus @ U-1, Incision C/D/I.  EXTREMITIES: ambulating, non tender, no erythema, b/l Merlyn's sign negative      Labs:     Hemoglobin   Date Value Ref Range Status   08/19/2024 9.2 (L) 11.5 - 15.4 g/dL Final   08/17/2024 8.8 (L) 11.5 - 15.4 g/dL Final   01/09/2015 12.6 11.5 - 15.4 g/dL Final     WBC   Date Value Ref Range Status   08/19/2024 8.70 4.31 - 10.16 Thousand/uL Final   08/17/2024 10.61 (H) 4.31 - 10.16 Thousand/uL Final   01/09/2015 6.96 4.31 - 10.16 Thousand/uL Final     Platelets   Date Value Ref Range Status   08/19/2024 189 149 - 390 Thousands/uL Final   08/17/2024 175 149 - 390 Thousands/uL Final   01/09/2015 330 149 - 390 Thousand/uL Final     Creatinine   Date Value Ref Range Status   08/19/2024 0.78 0.60 - 1.30 mg/dL Final     Comment:     Standardized to IDMS reference method   01/30/2024 0.62 0.60 - 1.30 mg/dL Final     Comment:     Standardized to IDMS reference method   01/09/2015 0.65 0.60 - 1.30 mg/dL Final     Comment:     Standardized to IDMS reference method     AST   Date Value Ref Range Status   08/19/2024 17 13 - 39 U/L Final   01/30/2024 15 13 - 39 U/L Final   01/09/2015 14 8 - 69 U/L Final     ALT   Date Value Ref Range Status   08/19/2024 14 7 - 52 U/L Final     Comment:     Specimen collection should occur prior to Sulfasalazine administration due to the potential for falsely depressed results.    01/30/2024 26 7 - 52 U/L Final     Comment:     Specimen collection should occur prior  to Sulfasalazine administration due to the potential for falsely depressed results.    01/09/2015 25 14 - 59 U/L Final          ANNAMARIA Odom  8/21/2024  6:55 AM

## 2024-08-21 NOTE — UTILIZATION REVIEW
Discharge Summary - Sommer Hernandez 38 y.o. female MRN: [id number]     Unit/Bed#: -01 Encounter: [id number]     Admission Date: 2024      Discharge Date: 24     Problem List  Patient Active Problem List  Diagnosis   History of stroke   Hyperglycemia   Thyroid nodule   Hypertension   Anxiety   Stenosis of left carotid artery   Mixed hyperlipidemia   Abnormal TSH   Obesity in pregnancy   Polyp of cervix affecting pregnancy in second trimester   Multigravida of advanced maternal age in third trimester   History of primary  section   S/P repeat low transverse    Short interval between pregnancies affecting pregnancy, antepartum   Chronic hypertension affecting pregnancy   Sterilization education          OBGYN Practice: [facility] Course:   Sommer Hernandez is a 38 y.o.  at 38w1d admitted for scheduled RLTCS in the setting of cHTN controlled on labetalol 200mg BID. She has a history of stroke in 2017 for which she was maintained on LDASA until delivery.      Delivery Findings:  Sommer delivered a viable female  on 2024 2:34 PM via , Low Transverse . The delivery was uncomplicated     Baby's Weight: 2720 g (5 lb 15.9 oz); 95.94    Apgar scores: 8  and 9  at 1 and 5 minutes, respectively  Anesthesia: spinal  QBL: 241      was transferred to  nursery. Patient tolerated the procedure well and was transferred to recovery in stable condition.      Her post-partum course was complicated by severe blood pressures; requiring titration of Labetalol 600mg TID and lasix 20 mg QD. Her post-partum pain was well controlled with oral analgesics. On day of discharge she was ambulating, voiding spontaneously, tolerating oral intake and hemodynamically stable. Mom's blood type is O positive and  Rhogam was not given.     She was discharged home on postpartum day #5 without complications. Patient was instructed to follow up  with her OB as an outpatient and was given appropriate warnings to call doctor or provider if she develops signs of infection or uncontrolled pain.     Discharge Problem List by Issue:   Chronic hypertension affecting pregnancy  Assessment & Plan  Home lab 200 BID  Increased Labetalol dosage to 600/300/600mg, Lasix 20mg qD for 05 days (starting )  Increased Labetalol dosage to 600md TID, Lasix 20md 2nd day (24)     This patient is Enrolled in the OB Postpartum Blood Pressure Monitoring Program.     Systolic (24hrs), Av , Min:125 , Max:175   Diastolic (24hrs), Av, Min:76, Max:99           History of stroke  Assessment & Plan  Continue LDASA daily  Avoid TXA     * S/P repeat low transverse   Assessment & Plan  ? Routine postpartum care  ? Encourage ambulation  ? Encourage familial bonding  ? Lactation support as needed  ? Pain: Motrin/Tylenol around the clock, oxycodone if needed  ? Postpartum Contraceptive plan: S/p Tubal  ? Pre-delivery Hgb 10.6 --> 8.8, received venofer  ? Yuen catheter: passed VT  ? DVT Ppx: ambulation, SCDs, Lovenox 40mg           Disposition: Home     Planned Readmission: No     Discharge Medications:   Please see AVS     Discharge instructions :   -Do not place anything (no partner, tampons or douche) in your vagina for 6 weeks.  -You may walk for exercise for the first 6 weeks then gradually return to your usual activities.   -Please do not drive for 1 week if you have no stitches and for 2 weeks if you have stitches or underwent a  delivery.    -You may take baths or shower per your preference.   -Please look at your bust (breasts) in the mirror daily and call your doctor for redness or tenderness or increased warmth.   - If you have had a  section please look at your incision daily as well and call provider for increasing redness or steady drainage from the incision.   -Please call your doctor's office if temperature > 100.4*F or 38* C, worsening  pain or a foul discharge.     Follow Up:  -Utilize blood pressure program and follow protocols.  - Follow up in 3 weeks for postpartum visit

## 2024-08-21 NOTE — LACTATION NOTE
This note was copied from a baby's chart.  CONSULT - LACTATION  Baby Girl Hernandez (Jessica) 5 days female MRN: [id number]    [facility] NURSERY Room / Bed: (N)/(N) Encounter: [id number]    Maternal Information     MOTHER:  Ewelina Hernandez  Maternal Age: 38 y.o.  OB History: # 1 - Date: None, Sex: None, Weight: None, GA: None, Type: None, Apgar1: None, Apgar5: None, Living: None, Birth Comments: None    # 2 - Date: 23, Sex: Female, Weight: 2650 g (5 lb 13.5 oz), GA: 38w0d, Type: , Low Transverse, Apgar1: 8, Apgar5: 9, Living: Living, Birth Comments: None    # 3 - Date: 24, Sex: Female, Weight: 2720 g (5 lb 15.9 oz), GA: 38w1d, Type: , Low Transverse, Apgar1: 8, Apgar5: 9, Living: Living, Birth Comments: None   Previouse breast reduction surgery? No    Lactation history:   Has patient previously breast fed: Yes   How long had patient previously breast fed: about 9 months   Previous breast feeding complications: Other (Comment), Low milk supply (establishing breast feeding then supplemnt when return to work)     Past Surgical History:   Procedure Laterality Date    [place]  DELIVERY ONLY N/A 3/23/2023    Procedure:  SECTION ();  Surgeon: Zoraida Flores MD;  Location: [facility];  Service: Obstetrics    [place]  DELIVERY ONLY N/A 2024    Procedure:  SECTION () REPEAT;  Surgeon: Zoraida Flores MD;  Location: [facility];  Service: Obstetrics    TOOTH EXTRACTION      wisdom teeth    TUBAL LIGATION Bilateral 2024    Procedure: LIGATION/COAGULATION TUBAL;  Surgeon: Zoraida Flores MD;  Location: [facility];  Service: Obstetrics       Birth information:  YOB: 2025   Time of birth: 2:34 PM   Sex: female   Delivery type: , Low Transverse   Birth Weight: 2720 g (5 lb 15.9 oz)   Percent of Weight Change: -7%     Gestational Age: 38w1d   [unfilled]    Assessment      Breast and nipple assessment: normal assessment     Assessment:  Not assessed at this time.    Feeding assessment: feeding well  LATCH:  Latch: Grasps breast, tongue down, lips flanged, rhythmic sucking   Audible Swallowing: Spontaneous and intermittent (24 hours old)   Type of Nipple: Everted (After stimulation)   Comfort (Breast/Nipple): Soft/non-tender   Hold (Positioning): Partial assist, teach one side, mother does other, staff holds   LATCH Score: 9         24 1000   Lactation Consultation   Reason for Consult 10 minutes;10 min   Maternal Information   Has mother  before? Yes   How long did the the mother previously breastfeed? about 9 months   LATCH Documentation   Latch 2   Audible Swallowing 2   Type of Nipple 2   Comfort (Breast/Nipple) 2   Hold (Positioning) 1   LATCH Score 9   Position(s) Used Laid Back  (Straddle hold)   Breasts/Nipples   Left Breast Filling;Engorged   Right Breast Filling;Engorged   Left Nipple Everted   Right Nipple Everted   Breastfeeding Progress Breastfeeding well   Patient Follow-Up   Lactation Consult Status 2   Follow-Up Type Outpatient;Call as needed   Other OB Lactation Documentation    Additional Problem Noted Sommer states that breastfeeding is going well. She reports that her breasts are filling and that she is pumping a little for engorgement relief. Discussed other engorgement comfort measures. Discussed prevention of plugged ducts and ways to relieve it. We reviewed positions and techniques to help slow fast let down for the baby.       Feeding recommendations:  breast feed on demand    Met with Sommer, she reports that baby is progressively doing better at feeding at both breasts.   She reports she is not manually hand pumping after feedings to give baby extra supplementation since baby is being efficient at both breasts. Initial supplementation implemented d/t 10% weight loss. Baby has been gaining weight since supplementing with Sommer's  breast milk.    She is encouraged to breastfeed on demand at least 8-12 times a day and watch for early hunger cues- fluttering eyes/waking from sleep, rooting (open mouth and turns head), bringing hands to mouth, sucking on tongue or hands, tight fists held at center of chest and crying.     Sommer reports her breasts are starting to feel engorged and that she has been manually hand pumping to relieve engorgement and storing the breast milk. Explained engorgement comfort measures- Continuing to breastfeed on demand, not waiting too long between feeds, hand expressing, pumping to comfort and not emptying the breast, reverse pressure softening to help the baby latch onto the breast. Using warm compress to help with milk flow and ice between feeds for inflammation.     We discussed plugged ducts - encouraging light hand message, refraining from deep tissue massage. Encouraged to continue to feed at the breast and take hot showers to help with increased blood flow to the breast. Discussed [facility] for outpatient resource.    Sommer reports baby is spitting up after feeding. Encouraged having baby feed in upright positions like the straddle hold or the laid back position and to occlude the breast a little when the baby is feeding to  to help with the let down. Encouraged to have the baby sit upright after feedings.    Discussed [facility] for continuing lactation support.     Ansley Ellsworth 8/21/2024 10:55 AM

## 2024-08-21 NOTE — PLAN OF CARE

## 2024-09-09 ENCOUNTER — POSTPARTUM VISIT (OUTPATIENT)
Dept: OBGYN CLINIC | Facility: CLINIC | Age: 39
End: 2024-09-09

## 2024-09-09 ENCOUNTER — TELEPHONE (OUTPATIENT)
Dept: OBGYN CLINIC | Facility: CLINIC | Age: 39
End: 2024-09-09

## 2024-09-09 VITALS
HEIGHT: 61 IN | WEIGHT: 172.8 LBS | SYSTOLIC BLOOD PRESSURE: 134 MMHG | BODY MASS INDEX: 32.62 KG/M2 | DIASTOLIC BLOOD PRESSURE: 70 MMHG

## 2024-09-09 DIAGNOSIS — Z98.891 S/P REPEAT LOW TRANSVERSE C-SECTION: ICD-10-CM

## 2024-09-09 DIAGNOSIS — I10 CHRONIC HYPERTENSION: Primary | ICD-10-CM

## 2024-09-09 DIAGNOSIS — F41.8 POSTPARTUM ANXIETY: ICD-10-CM

## 2024-09-09 PROCEDURE — 99024 POSTOP FOLLOW-UP VISIT: CPT | Performed by: STUDENT IN AN ORGANIZED HEALTH CARE EDUCATION/TRAINING PROGRAM

## 2024-09-09 NOTE — PROGRESS NOTES
"[facility] [address] [place] Luque [place]    Assessment/Plan:  Sommer Hernandez is a 38 y.o.  who presents for postpartum care.    Chronic hypertension  - Getting some hypotension 90/50  - Can decrease labetalol from 600 mg TID to 600 mg BID for 5 days, if pressures are maintained at < 140/90 can continue at this dose    Diagnoses and all orders for this visit:    Chronic hypertension    S/P repeat low transverse     Postpartum anxiety  -     Ambulatory referral to Psych Services; Future          Subjective:   Sommer Hernandez is a 38 y.o.  female.  CC: postpartum visit    HPI: Ewelina presents for routine postpartum care after repeat  and tubal.  She has chronic hypertension on labetalol 600 mg TID and has noted some intermittent hypotension.  She reports some anxiety, fearing something bad might happen, and feeling extra emotional.  She denies thoughts of self harm.    Breastfeeding going well.  No incision concerns.        ROS: Review of Systems   Constitutional:  Negative for chills and fever.   Respiratory:  Negative for cough and shortness of breath.    Cardiovascular:  Negative for chest pain and leg swelling.   Gastrointestinal:  Negative for abdominal pain, nausea and vomiting.   Genitourinary:  Negative for dysuria, frequency and urgency.   Neurological:  Negative for dizziness, light-headedness and headaches.       PFSH: The following portions of the patient's history were reviewed and updated as appropriate: allergies, current medications, past family history, past medical history, obstetric history, gynecologic history, past social history, past surgical history and problem list.       Objective:  /70   Ht 5' 1\" (1.549 m)   Wt 78.4 kg (172 lb 12.8 oz)   LMP 2023 (Exact Date)   Breastfeeding Yes   BMI 32.65 kg/m²    Physical Exam  Constitutional:       Appearance: Normal appearance.   HENT:      Head: Normocephalic and " atraumatic.      Mouth/Throat:      Mouth: Mucous membranes are moist.      Pharynx: Oropharynx is clear. No oropharyngeal exudate.   Cardiovascular:      Rate and Rhythm: Normal rate.   Pulmonary:      Effort: Pulmonary effort is normal.   Abdominal:      General: Abdomen is flat. There is no distension.      Palpations: Abdomen is soft. There is no mass.      Tenderness: There is no abdominal tenderness.      Hernia: No hernia is present.      Comments: Incision is clean, dry, intact, and well healing.  There is no erythema, induration, fluctuance, or drainage.   Skin:     General: Skin is warm and dry.   Neurological:      General: No focal deficit present.      Mental Status: She is alert and oriented to person, place, and time.   Psychiatric:         Mood and Affect: Mood normal.         Behavior: Behavior normal.           Zoraida Flores MD  [facility]  [facility]  9/9/2024 3:50 PM

## 2024-09-09 NOTE — ASSESSMENT & PLAN NOTE
- Getting some hypotension 90/50  - Can decrease labetalol from 600 mg TID to 600 mg BID for 5 days, if pressures are maintained at < 140/90 can continue at this dose

## 2024-09-09 NOTE — TELEPHONE ENCOUNTER
Patient has been noncompliant in BP monitoring program.  Has not submitted blood pressure readings since 9/7/2024.  Called to remind patient to submit readings.  Left reminder message on voicemail to submit BP readings twice daily.

## 2024-09-23 ENCOUNTER — TELEPHONE (OUTPATIENT)
Dept: OBGYN CLINIC | Facility: CLINIC | Age: 39
End: 2024-09-23

## 2024-09-23 NOTE — TELEPHONE ENCOUNTER
Patient has been noncompliant in BP monitoring program.  Has not submitted blood pressure readings since 9/20.  Called to remind patient to submit readings.  Left reminder message on voicemail to submit BP readings twice daily.

## 2024-09-24 ENCOUNTER — TELEPHONE (OUTPATIENT)
Dept: OBGYN CLINIC | Facility: CLINIC | Age: 39
End: 2024-09-24

## 2024-09-24 ENCOUNTER — TELEMEDICINE (OUTPATIENT)
Dept: OBGYN CLINIC | Facility: CLINIC | Age: 39
End: 2024-09-24

## 2024-09-24 DIAGNOSIS — Z98.891 S/P REPEAT LOW TRANSVERSE C-SECTION: ICD-10-CM

## 2024-09-24 PROCEDURE — 99024 POSTOP FOLLOW-UP VISIT: CPT | Performed by: STUDENT IN AN ORGANIZED HEALTH CARE EDUCATION/TRAINING PROGRAM

## 2024-09-24 RX ORDER — LABETALOL 300 MG/1
300 TABLET, FILM COATED ORAL 2 TIMES DAILY
Qty: 60 TABLET | Refills: 0 | Status: SHIPPED | OUTPATIENT
Start: 2024-09-24 | End: 2024-10-24

## 2024-09-24 NOTE — ASSESSMENT & PLAN NOTE
Orders:    labetalol (NORMODYNE) 300 mg tablet; Take 1 tablet (300 mg total) by mouth 2 (two) times a day    
9.89

## 2024-09-25 ENCOUNTER — TELEPHONE (OUTPATIENT)
Dept: OBGYN CLINIC | Facility: MEDICAL CENTER | Age: 39
End: 2024-09-25

## 2024-09-25 NOTE — TELEPHONE ENCOUNTER
Patient has been noncompliant in BP monitoring program.  Has not submitted blood pressure readings since 9/20/24.  Called to remind patient to submit readings.  Left reminder message on voicemail to submit BP readings twice daily.

## 2024-09-26 NOTE — TELEPHONE ENCOUNTER
Per [facility] BP monitoring data base log, pt has submitted BP yesterday

## 2024-10-01 ENCOUNTER — TELEPHONE (OUTPATIENT)
Dept: OBGYN CLINIC | Facility: CLINIC | Age: 39
End: 2024-10-01

## 2024-10-01 NOTE — TELEPHONE ENCOUNTER
Patient has been noncompliant in BP monitoring program.  Has not submitted blood pressure readings since 9/29.  Called to remind patient to submit readings.  Left reminder message on voicemail to submit BP readings twice daily.

## 2024-10-02 ENCOUNTER — TELEPHONE (OUTPATIENT)
Dept: OTHER | Facility: HOSPITAL | Age: 39
End: 2024-10-02

## 2024-10-30 DIAGNOSIS — Z98.891 S/P REPEAT LOW TRANSVERSE C-SECTION: ICD-10-CM

## 2024-10-30 RX ORDER — LABETALOL 300 MG/1
300 TABLET, FILM COATED ORAL 2 TIMES DAILY
Qty: 60 TABLET | Refills: 0 | Status: SHIPPED | OUTPATIENT
Start: 2024-10-30

## 2024-12-07 DIAGNOSIS — Z98.891 S/P REPEAT LOW TRANSVERSE C-SECTION: ICD-10-CM

## 2024-12-10 RX ORDER — LABETALOL 300 MG/1
300 TABLET, FILM COATED ORAL 2 TIMES DAILY
Qty: 60 TABLET | Refills: 0 | Status: SHIPPED | OUTPATIENT
Start: 2024-12-10

## 2025-01-09 ENCOUNTER — TELEPHONE (OUTPATIENT)
Dept: FAMILY MEDICINE CLINIC | Facility: CLINIC | Age: 40
End: 2025-01-09

## 2025-01-18 DIAGNOSIS — Z98.891 S/P REPEAT LOW TRANSVERSE C-SECTION: ICD-10-CM

## 2025-01-23 RX ORDER — LABETALOL 300 MG/1
300 TABLET, FILM COATED ORAL 2 TIMES DAILY
Qty: 60 TABLET | Refills: 0 | Status: SHIPPED | OUTPATIENT
Start: 2025-01-23

## 2025-01-28 ENCOUNTER — TELEPHONE (OUTPATIENT)
Dept: FAMILY MEDICINE CLINIC | Facility: CLINIC | Age: 40
End: 2025-01-28

## 2025-01-28 NOTE — TELEPHONE ENCOUNTER
1/28 mail box was full so I sent MYC msg to r/s due to provider being out  
needs device/independent

## 2025-02-25 DIAGNOSIS — Z98.891 S/P REPEAT LOW TRANSVERSE C-SECTION: ICD-10-CM

## 2025-03-03 RX ORDER — LABETALOL 300 MG/1
300 TABLET, FILM COATED ORAL 2 TIMES DAILY
Qty: 60 TABLET | Refills: 0 | Status: SHIPPED | OUTPATIENT
Start: 2025-03-03 | End: 2025-03-04 | Stop reason: SDUPTHER

## 2025-03-04 ENCOUNTER — OFFICE VISIT (OUTPATIENT)
Dept: FAMILY MEDICINE CLINIC | Facility: CLINIC | Age: 40
End: 2025-03-04
Payer: COMMERCIAL

## 2025-03-04 VITALS
SYSTOLIC BLOOD PRESSURE: 128 MMHG | RESPIRATION RATE: 18 BRPM | OXYGEN SATURATION: 99 % | BODY MASS INDEX: 32.85 KG/M2 | WEIGHT: 174 LBS | TEMPERATURE: 97.9 F | HEART RATE: 71 BPM | DIASTOLIC BLOOD PRESSURE: 70 MMHG | HEIGHT: 61 IN

## 2025-03-04 DIAGNOSIS — Z00.00 ANNUAL PHYSICAL EXAM: Primary | ICD-10-CM

## 2025-03-04 DIAGNOSIS — R00.2 PALPITATIONS: ICD-10-CM

## 2025-03-04 DIAGNOSIS — Z12.31 ENCOUNTER FOR SCREENING MAMMOGRAM FOR BREAST CANCER: ICD-10-CM

## 2025-03-04 DIAGNOSIS — Z86.79 HISTORY OF CAROTID ARTERY DISEASE: ICD-10-CM

## 2025-03-04 DIAGNOSIS — Z98.891 S/P REPEAT LOW TRANSVERSE C-SECTION: ICD-10-CM

## 2025-03-04 PROCEDURE — 99396 PREV VISIT EST AGE 40-64: CPT | Performed by: INTERNAL MEDICINE

## 2025-03-04 RX ORDER — LABETALOL 300 MG/1
300 TABLET, FILM COATED ORAL 2 TIMES DAILY
Qty: 60 TABLET | Refills: 5 | Status: SHIPPED | OUTPATIENT
Start: 2025-03-04

## 2025-03-04 NOTE — ASSESSMENT & PLAN NOTE
Orders:    Lipid panel; Future    Comprehensive metabolic panel; Future    CBC and Platelet; Future  Rx fbw

## 2025-03-04 NOTE — ASSESSMENT & PLAN NOTE
Orders:    labetalol (NORMODYNE) 300 mg tablet; Take 1 tablet (300 mg total) by mouth 2 (two) times a day  COntinue labetalol and encouraged lo sodium diet and exercise

## 2025-03-04 NOTE — PROGRESS NOTES
Adult Annual Physical  Name: Sommer Hernandez      : 1985      MRN: [id number]  Encounter Provider: Luly Honeycutt DO  Encounter Date: 3/4/2025   Encounter department: [facility]    Assessment & Plan  Annual physical exam    Orders:    Lipid panel; Future    Comprehensive metabolic panel; Future    CBC and Platelet; Future  Rx fbw  Palpitations    Orders:    TSH, 3rd generation with Free T4 reflex; Future    Thyroid Antibodies Panel; Future    Iron Panel (Includes Ferritin, Iron Sat%, Iron, and TIBC); Future    ECG 12 lead; Future    Encounter for screening mammogram for breast cancer    Orders:    Mammo diagnostic right w 3d and cad; Future  Pt will schedule baseline mammogram  S/P repeat low transverse     Orders:    labetalol (NORMODYNE) 300 mg tablet; Take 1 tablet (300 mg total) by mouth 2 (two) times a day  COntinue labetalol and encouraged lo sodium diet and exercise   History of carotid artery disease    Orders:    VAS carotid complete study; Future    Immunizations and preventive care screenings were discussed with patient today. Appropriate education was printed on patient's after visit summary.    Counseling:  Exercise: the importance of regular exercise/physical activity was discussed. Recommend exercise 3-5 times per week for at least 30 minutes.       Depression Screening and Follow-up Plan: Patient was screened for depression during today's encounter. They screened negative with a PHQ-2 score of 0.      Rto Annual/prn    History of Present Illness   Pt generally well She had a second baby girl in August and is doing well overall No recent illness BP was higher end of pregnancy and meds were increased but she is back to labetalol 300mg BID and BP stable She had some pm palpitations at rest but has been drinking a bit more coffee recently    Adult Annual Physical  Review of Systems   Constitutional:  Negative for chills and fever.   HENT: Negative.     Eyes:   Negative for visual disturbance.   Respiratory:  Negative for cough and shortness of breath.    Cardiovascular: Negative.    Gastrointestinal: Negative.    Genitourinary: Negative.    Musculoskeletal: Negative.    Neurological:  Negative for dizziness, light-headedness and headaches.   Psychiatric/Behavioral:  Negative for sleep disturbance. The patient is not nervous/anxious.      Past Medical History:   Diagnosis Date    Carotid stenosis, asymptomatic, left 2021 There is <50% stenosis noted in the internal carotid artery on Left. Plaque is homogenous and smooth. 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    Thyroid nodule 04/10/2018    5/26/20 US completed and does not recommend followup. Stable since 2018     Past Surgical History:   Procedure Laterality Date    HI  DELIVERY ONLY N/A 3/23/2023    Procedure:  SECTION ();  Surgeon: Zoraida Flores MD;  Location: [facility];  Service: Obstetrics    HI  DELIVERY ONLY N/A 2024    Procedure:  SECTION () REPEAT;  Surgeon: Zoraida Flores MD;  Location: [facility];  Service: Obstetrics    TOOTH EXTRACTION      wisdom teeth    TUBAL LIGATION Bilateral 2024    Procedure: LIGATION/COAGULATION TUBAL;  Surgeon: Zoraida Flores MD;  Location: [facility];  Service: Obstetrics     Social History     Socioeconomic History    Marital status: /Civil Union     Spouse name: Not on file    Number of children: Not on file    Years of education: Not on file    Highest education level: Not on file   Occupational History    Not on file   Tobacco Use    Smoking status: Never    Smokeless tobacco: Never   Vaping Use    Vaping status: Never Used   Substance and Sexual Activity    Alcohol use: Not Currently    Drug use: Never    Sexual activity: Yes     Partners: Male     Birth  "control/protection: None   Other Topics Concern    Not on file   Social History Narrative    Not on file     Social Drivers of Health     Financial Resource Strain: Not on file   Food Insecurity: No Food Insecurity (8/17/2024)    Nursing - Inadequate Food Risk Classification     Worried About Running Out of Food in the Last Year: Never true     Ran Out of Food in the Last Year: Never true     Ran Out of Food in the Last Year: Not on file   Transportation Needs: No Transportation Needs (8/17/2024)    PRAPARE - Transportation     Lack of Transportation (Medical): No     Lack of Transportation (Non-Medical): No   Physical Activity: Not on file   Stress: Not on file   Social Connections: Unknown (6/18/2024)    Received from [employer]    Social [place]     How often do you feel lonely or isolated from those around you? (Adult - for ages 18 years and over): Not on file   Intimate Partner Violence: Not on file   Housing Stability: Low Risk  (8/17/2024)    Housing Stability Vital Sign     Unable to Pay for Housing in the Last Year: No     Number of Times Moved in the Last Year: 0     Homeless in the Last Year: No     Allergies   Allergen Reactions    Amoxicillin Rash    Penicillins Rash         Objective   /70   Pulse 71   Temp 97.9 °F (36.6 °C) (Temporal)   Resp 18   Ht 5' 1\" (1.549 m)   Wt 78.9 kg (174 lb)   SpO2 99%   BMI 32.88 kg/m²     Physical Exam  Vitals and nursing note reviewed.   Constitutional:       General: She is not in acute distress.     Appearance: Normal appearance. She is not ill-appearing, toxic-appearing or diaphoretic.   HENT:      Head: Normocephalic and atraumatic.      Right Ear: External ear normal.      Left Ear: External ear normal.      Nose: Nose normal.      Mouth/Throat:      Mouth: Mucous membranes are moist.   Eyes:      General: No scleral icterus.  Cardiovascular:      Rate and Rhythm: Normal rate and regular rhythm.      Pulses: Normal pulses.      Heart sounds: Normal " heart sounds. No murmur heard.  Pulmonary:      Effort: Pulmonary effort is normal. No respiratory distress.      Breath sounds: Normal breath sounds. No wheezing.   Abdominal:      General: Bowel sounds are normal. There is no distension.      Palpations: Abdomen is soft.      Tenderness: There is no abdominal tenderness.   Musculoskeletal:      Cervical back: Normal range of motion and neck supple.      Right lower leg: No edema.      Left lower leg: No edema.   Lymphadenopathy:      Cervical: No cervical adenopathy.   Skin:     General: Skin is warm and dry.   Neurological:      General: No focal deficit present.      Mental Status: She is alert and oriented to person, place, and time. Mental status is at baseline.      Cranial Nerves: No cranial nerve deficit.      Sensory: No sensory deficit.   Psychiatric:         Mood and Affect: Mood normal.         Behavior: Behavior normal.         Thought Content: Thought content normal.         Judgment: Judgment normal.

## 2025-03-25 ENCOUNTER — HOSPITAL ENCOUNTER (OUTPATIENT)
Dept: NON INVASIVE DIAGNOSTICS | Facility: HOSPITAL | Age: 40
Discharge: HOME | End: 2025-03-25
Payer: COMMERCIAL

## 2025-03-25 DIAGNOSIS — Z86.79 HISTORY OF CAROTID ARTERY DISEASE: ICD-10-CM

## 2025-03-25 PROCEDURE — 93880 EXTRACRANIAL BILAT STUDY: CPT

## 2025-03-26 PROCEDURE — 93880 EXTRACRANIAL BILAT STUDY: CPT | Performed by: SURGERY

## 2025-06-20 ENCOUNTER — TELEPHONE (OUTPATIENT)
Age: 40
End: 2025-06-20

## 2025-06-20 DIAGNOSIS — Z11.1 PPD SCREENING TEST: Primary | ICD-10-CM

## 2025-06-30 ENCOUNTER — CLINICAL SUPPORT (OUTPATIENT)
Dept: FAMILY MEDICINE CLINIC | Facility: CLINIC | Age: 40
End: 2025-06-30
Payer: COMMERCIAL

## 2025-06-30 DIAGNOSIS — Z11.1 PPD SCREENING TEST: Primary | ICD-10-CM

## 2025-06-30 PROCEDURE — 86580 TB INTRADERMAL TEST: CPT

## 2025-07-26 ENCOUNTER — APPOINTMENT (OUTPATIENT)
Dept: LAB | Facility: CLINIC | Age: 40
End: 2025-07-26
Payer: COMMERCIAL

## 2025-07-26 DIAGNOSIS — Z00.00 ANNUAL PHYSICAL EXAM: ICD-10-CM

## 2025-07-26 DIAGNOSIS — R00.2 PALPITATIONS: ICD-10-CM

## 2025-07-26 LAB
ALBUMIN SERPL BCG-MCNC: 4.4 G/DL (ref 3.5–5)
ALP SERPL-CCNC: 55 U/L (ref 34–104)
ALT SERPL W P-5'-P-CCNC: 25 U/L (ref 7–52)
ANION GAP SERPL CALCULATED.3IONS-SCNC: 6 MMOL/L (ref 4–13)
AST SERPL W P-5'-P-CCNC: 27 U/L (ref 13–39)
BILIRUB SERPL-MCNC: 0.45 MG/DL (ref 0.2–1)
BUN SERPL-MCNC: 12 MG/DL (ref 5–25)
CALCIUM SERPL-MCNC: 9.4 MG/DL (ref 8.4–10.2)
CHLORIDE SERPL-SCNC: 106 MMOL/L (ref 96–108)
CHOLEST SERPL-MCNC: 174 MG/DL (ref ?–200)
CO2 SERPL-SCNC: 26 MMOL/L (ref 21–32)
CREAT SERPL-MCNC: 0.8 MG/DL (ref 0.6–1.3)
ERYTHROCYTE [DISTWIDTH] IN BLOOD BY AUTOMATED COUNT: 13 % (ref 11.6–15.1)
FERRITIN SERPL-MCNC: 6 NG/ML (ref 30–307)
GFR SERPL CREATININE-BSD FRML MDRD: 92 ML/MIN/1.73SQ M
GLUCOSE P FAST SERPL-MCNC: 81 MG/DL (ref 65–99)
HCT VFR BLD AUTO: 39.9 % (ref 34.8–46.1)
HDLC SERPL-MCNC: 55 MG/DL
HGB BLD-MCNC: 12.8 G/DL (ref 11.5–15.4)
IRON SATN MFR SERPL: 10 % (ref 15–50)
IRON SERPL-MCNC: 43 UG/DL (ref 50–212)
LDLC SERPL CALC-MCNC: 102 MG/DL (ref 0–100)
MCH RBC QN AUTO: 29.1 PG (ref 26.8–34.3)
MCHC RBC AUTO-ENTMCNC: 32.1 G/DL (ref 31.4–37.4)
MCV RBC AUTO: 91 FL (ref 82–98)
NONHDLC SERPL-MCNC: 119 MG/DL
PLATELET # BLD AUTO: 294 THOUSANDS/UL (ref 149–390)
PMV BLD AUTO: 11 FL (ref 8.9–12.7)
POTASSIUM SERPL-SCNC: 4.4 MMOL/L (ref 3.5–5.3)
PROT SERPL-MCNC: 7.2 G/DL (ref 6.4–8.4)
RBC # BLD AUTO: 4.4 MILLION/UL (ref 3.81–5.12)
SODIUM SERPL-SCNC: 138 MMOL/L (ref 135–147)
TIBC SERPL-MCNC: 449.4 UG/DL (ref 250–450)
TRANSFERRIN SERPL-MCNC: 321 MG/DL (ref 203–362)
TRIGL SERPL-MCNC: 83 MG/DL (ref ?–150)
TSH SERPL DL<=0.05 MIU/L-ACNC: 1.03 UIU/ML (ref 0.45–4.5)
UIBC SERPL-MCNC: 406 UG/DL (ref 155–355)
WBC # BLD AUTO: 7.89 THOUSAND/UL (ref 4.31–10.16)

## 2025-07-26 PROCEDURE — 84443 ASSAY THYROID STIM HORMONE: CPT

## 2025-07-26 PROCEDURE — 36415 COLL VENOUS BLD VENIPUNCTURE: CPT

## 2025-07-26 PROCEDURE — 85027 COMPLETE CBC AUTOMATED: CPT

## 2025-07-26 PROCEDURE — 80061 LIPID PANEL: CPT

## 2025-07-26 PROCEDURE — 83550 IRON BINDING TEST: CPT

## 2025-07-26 PROCEDURE — 86376 MICROSOMAL ANTIBODY EACH: CPT

## 2025-07-26 PROCEDURE — 82728 ASSAY OF FERRITIN: CPT

## 2025-07-26 PROCEDURE — 83540 ASSAY OF IRON: CPT

## 2025-07-26 PROCEDURE — 86800 THYROGLOBULIN ANTIBODY: CPT

## 2025-07-26 PROCEDURE — 80053 COMPREHEN METABOLIC PANEL: CPT

## 2025-07-27 LAB — THYROPEROXIDASE AB SERPL-ACNC: <9 IU/ML (ref 0–34)

## 2025-07-29 LAB — THYROGLOB AB SERPL-ACNC: <1 IU/ML (ref 0–0.9)

## 2025-08-22 DIAGNOSIS — Z98.891 S/P REPEAT LOW TRANSVERSE C-SECTION: ICD-10-CM

## 2025-08-22 RX ORDER — LABETALOL 300 MG/1
300 TABLET, FILM COATED ORAL 2 TIMES DAILY
Qty: 60 TABLET | Refills: 5 | Status: SHIPPED | OUTPATIENT
Start: 2025-08-22

## (undated) DEVICE — MICRO HVTSA, 0.5G AND HVTSA SOURCEMARK PRODUCT CODE M1206 AND M1206-01: Brand: EXOFIN MICRO HVTSA, 0.5G

## (undated) DEVICE — DRESSING GUAZE ADH BORDER 4 X 4 IN

## (undated) DEVICE — DRESSING TELFA 2 X 3 IN STRL